# Patient Record
Sex: FEMALE | Race: WHITE | NOT HISPANIC OR LATINO
[De-identification: names, ages, dates, MRNs, and addresses within clinical notes are randomized per-mention and may not be internally consistent; named-entity substitution may affect disease eponyms.]

---

## 2017-11-02 ENCOUNTER — APPOINTMENT (OUTPATIENT)
Dept: ENDOCRINOLOGY | Facility: CLINIC | Age: 72
End: 2017-11-02
Payer: MEDICARE

## 2017-11-02 VITALS
BODY MASS INDEX: 26.28 KG/M2 | HEART RATE: 79 BPM | SYSTOLIC BLOOD PRESSURE: 124 MMHG | WEIGHT: 141 LBS | DIASTOLIC BLOOD PRESSURE: 78 MMHG | OXYGEN SATURATION: 98 % | HEIGHT: 61.5 IN

## 2017-11-02 PROCEDURE — 99214 OFFICE O/P EST MOD 30 MIN: CPT

## 2018-02-08 ENCOUNTER — LABORATORY RESULT (OUTPATIENT)
Age: 73
End: 2018-02-08

## 2018-02-08 ENCOUNTER — APPOINTMENT (OUTPATIENT)
Dept: ENDOCRINOLOGY | Facility: CLINIC | Age: 73
End: 2018-02-08
Payer: MEDICARE

## 2018-02-08 VITALS
BODY MASS INDEX: 26.28 KG/M2 | SYSTOLIC BLOOD PRESSURE: 140 MMHG | WEIGHT: 141 LBS | HEART RATE: 90 BPM | HEIGHT: 61.5 IN | OXYGEN SATURATION: 95 % | DIASTOLIC BLOOD PRESSURE: 80 MMHG

## 2018-02-08 PROCEDURE — 10022: CPT

## 2018-02-08 PROCEDURE — 99213 OFFICE O/P EST LOW 20 MIN: CPT | Mod: 25

## 2018-02-08 PROCEDURE — 76942 ECHO GUIDE FOR BIOPSY: CPT

## 2018-02-13 LAB — TSH SERPL-ACNC: 0.78 UIU/ML

## 2018-09-20 ENCOUNTER — APPOINTMENT (OUTPATIENT)
Dept: ENDOCRINOLOGY | Facility: CLINIC | Age: 73
End: 2018-09-20

## 2018-11-13 ENCOUNTER — APPOINTMENT (OUTPATIENT)
Dept: ENDOCRINOLOGY | Facility: CLINIC | Age: 73
End: 2018-11-13
Payer: MEDICARE

## 2018-11-13 VITALS — HEIGHT: 61.25 IN | BODY MASS INDEX: 27.58 KG/M2 | WEIGHT: 148 LBS

## 2018-11-13 VITALS — DIASTOLIC BLOOD PRESSURE: 70 MMHG | SYSTOLIC BLOOD PRESSURE: 130 MMHG | HEART RATE: 77 BPM | OXYGEN SATURATION: 98 %

## 2018-11-13 PROCEDURE — 76536 US EXAM OF HEAD AND NECK: CPT

## 2018-11-13 PROCEDURE — ZZZZZ: CPT

## 2018-11-13 PROCEDURE — 99214 OFFICE O/P EST MOD 30 MIN: CPT | Mod: 25

## 2018-11-13 PROCEDURE — 77085 DXA BONE DENSITY AXL VRT FX: CPT | Mod: GA

## 2018-11-14 LAB
25(OH)D3 SERPL-MCNC: 46.5 NG/ML
ANION GAP SERPL CALC-SCNC: 10 MMOL/L
BUN SERPL-MCNC: 10 MG/DL
CALCIUM SERPL-MCNC: 9.9 MG/DL
CHLORIDE SERPL-SCNC: 100 MMOL/L
CHOLEST SERPL-MCNC: 192 MG/DL
CHOLEST/HDLC SERPL: 3.3 RATIO
CO2 SERPL-SCNC: 29 MMOL/L
CREAT SERPL-MCNC: 0.8 MG/DL
GLUCOSE SERPL-MCNC: 87 MG/DL
HBA1C MFR BLD HPLC: 6.4 %
HDLC SERPL-MCNC: 58 MG/DL
LDLC SERPL CALC-MCNC: 111 MG/DL
POTASSIUM SERPL-SCNC: 4.3 MMOL/L
SODIUM SERPL-SCNC: 139 MMOL/L
TRIGL SERPL-MCNC: 116 MG/DL
TSH SERPL-ACNC: 0.68 UIU/ML

## 2018-12-14 ENCOUNTER — APPOINTMENT (OUTPATIENT)
Dept: COLORECTAL SURGERY | Facility: CLINIC | Age: 73
End: 2018-12-14
Payer: MEDICARE

## 2018-12-14 VITALS
DIASTOLIC BLOOD PRESSURE: 73 MMHG | HEART RATE: 81 BPM | HEIGHT: 61.5 IN | OXYGEN SATURATION: 99 % | BODY MASS INDEX: 27.03 KG/M2 | RESPIRATION RATE: 15 BRPM | SYSTOLIC BLOOD PRESSURE: 121 MMHG | WEIGHT: 145 LBS

## 2018-12-14 PROCEDURE — 99204 OFFICE O/P NEW MOD 45 MIN: CPT

## 2018-12-14 RX ORDER — CINNAMON BARK 500 MG
CAPSULE ORAL
Refills: 0 | Status: ACTIVE | COMMUNITY

## 2018-12-14 RX ORDER — BACILLUS COAGULANS/INULIN 1B-250 MG
CAPSULE ORAL
Refills: 0 | Status: ACTIVE | COMMUNITY

## 2018-12-28 ENCOUNTER — OUTPATIENT (OUTPATIENT)
Dept: OUTPATIENT SERVICES | Facility: HOSPITAL | Age: 73
LOS: 1 days | End: 2018-12-28
Payer: MEDICARE

## 2018-12-28 VITALS
SYSTOLIC BLOOD PRESSURE: 130 MMHG | DIASTOLIC BLOOD PRESSURE: 80 MMHG | HEART RATE: 72 BPM | RESPIRATION RATE: 18 BRPM | TEMPERATURE: 99 F | OXYGEN SATURATION: 96 % | WEIGHT: 149.03 LBS | HEIGHT: 61 IN

## 2018-12-28 DIAGNOSIS — Z90.89 ACQUIRED ABSENCE OF OTHER ORGANS: Chronic | ICD-10-CM

## 2018-12-28 DIAGNOSIS — K57.92 DIVERTICULITIS OF INTESTINE, PART UNSPECIFIED, WITHOUT PERFORATION OR ABSCESS WITHOUT BLEEDING: ICD-10-CM

## 2018-12-28 DIAGNOSIS — K57.32 DIVERTICULITIS OF LARGE INTESTINE WITHOUT PERFORATION OR ABSCESS WITHOUT BLEEDING: ICD-10-CM

## 2018-12-28 DIAGNOSIS — Z01.818 ENCOUNTER FOR OTHER PREPROCEDURAL EXAMINATION: ICD-10-CM

## 2018-12-28 DIAGNOSIS — Z98.891 HISTORY OF UTERINE SCAR FROM PREVIOUS SURGERY: Chronic | ICD-10-CM

## 2018-12-28 DIAGNOSIS — Z29.9 ENCOUNTER FOR PROPHYLACTIC MEASURES, UNSPECIFIED: ICD-10-CM

## 2018-12-28 LAB
ANION GAP SERPL CALC-SCNC: 13 MMOL/L — SIGNIFICANT CHANGE UP (ref 5–17)
BLD GP AB SCN SERPL QL: NEGATIVE — SIGNIFICANT CHANGE UP
BUN SERPL-MCNC: 14 MG/DL — SIGNIFICANT CHANGE UP (ref 7–23)
CALCIUM SERPL-MCNC: 9.8 MG/DL — SIGNIFICANT CHANGE UP (ref 8.4–10.5)
CHLORIDE SERPL-SCNC: 101 MMOL/L — SIGNIFICANT CHANGE UP (ref 96–108)
CO2 SERPL-SCNC: 26 MMOL/L — SIGNIFICANT CHANGE UP (ref 22–31)
CREAT SERPL-MCNC: 0.78 MG/DL — SIGNIFICANT CHANGE UP (ref 0.5–1.3)
GLUCOSE SERPL-MCNC: 93 MG/DL — SIGNIFICANT CHANGE UP (ref 70–99)
HBA1C BLD-MCNC: 6.6 % — HIGH (ref 4–5.6)
HCT VFR BLD CALC: 41.2 % — SIGNIFICANT CHANGE UP (ref 34.5–45)
HGB BLD-MCNC: 13.6 G/DL — SIGNIFICANT CHANGE UP (ref 11.5–15.5)
MCHC RBC-ENTMCNC: 29.6 PG — SIGNIFICANT CHANGE UP (ref 27–34)
MCHC RBC-ENTMCNC: 33 GM/DL — SIGNIFICANT CHANGE UP (ref 32–36)
MCV RBC AUTO: 89.8 FL — SIGNIFICANT CHANGE UP (ref 80–100)
PLATELET # BLD AUTO: 318 K/UL — SIGNIFICANT CHANGE UP (ref 150–400)
POTASSIUM SERPL-MCNC: 4.1 MMOL/L — SIGNIFICANT CHANGE UP (ref 3.5–5.3)
POTASSIUM SERPL-SCNC: 4.1 MMOL/L — SIGNIFICANT CHANGE UP (ref 3.5–5.3)
RBC # BLD: 4.59 M/UL — SIGNIFICANT CHANGE UP (ref 3.8–5.2)
RBC # FLD: 14.3 % — SIGNIFICANT CHANGE UP (ref 10.3–14.5)
RH IG SCN BLD-IMP: POSITIVE — SIGNIFICANT CHANGE UP
SODIUM SERPL-SCNC: 140 MMOL/L — SIGNIFICANT CHANGE UP (ref 135–145)
WBC # BLD: 7.68 K/UL — SIGNIFICANT CHANGE UP (ref 3.8–10.5)
WBC # FLD AUTO: 7.68 K/UL — SIGNIFICANT CHANGE UP (ref 3.8–10.5)

## 2018-12-28 PROCEDURE — 83036 HEMOGLOBIN GLYCOSYLATED A1C: CPT

## 2018-12-28 PROCEDURE — 86900 BLOOD TYPING SEROLOGIC ABO: CPT

## 2018-12-28 PROCEDURE — 86850 RBC ANTIBODY SCREEN: CPT

## 2018-12-28 PROCEDURE — 80048 BASIC METABOLIC PNL TOTAL CA: CPT

## 2018-12-28 PROCEDURE — 86901 BLOOD TYPING SEROLOGIC RH(D): CPT

## 2018-12-28 PROCEDURE — 85027 COMPLETE CBC AUTOMATED: CPT

## 2018-12-28 PROCEDURE — G0463: CPT

## 2018-12-28 PROCEDURE — 87086 URINE CULTURE/COLONY COUNT: CPT

## 2018-12-28 RX ORDER — CEFOTETAN DISODIUM 1 G
2 VIAL (EA) INJECTION ONCE
Qty: 0 | Refills: 0 | Status: DISCONTINUED | OUTPATIENT
Start: 2019-01-10 | End: 2019-01-11

## 2018-12-28 NOTE — H&P PST ADULT - HISTORY OF PRESENT ILLNESS
Pt. is a 73 year old female with PMH of pre-diabetic not on medication, hypothyroidism, hld, diverticulitis (last hospitalized in 2015). Pt. had routine colonoscopy  on december 7th 2018 and due to possible adhesions was unable to be completed. Virtual colonoscopy was then performed and was significant for thickening and possible strictures. Pt. presents today for presurgical testing for Laparoscopic anterior resection. Pt. states feels well today denies any complaints. Pt. is a 73 year old female with PMH of pre-diabetic not on medication, hypothyroidism, hld, diverticulitis (last hospitalized in 2015). Pt. had routine colonoscopy  on december 7th 2018 and due to possible adhesions was unable to be completed. Virtual colonoscopy was then performed and was significant for thickening and possible strictures. Pt. presents today for presurgical testing for Laparoscopic anterior resection scheduled for 1/10/2019. Pt. states feels well today denies any complaints.

## 2018-12-28 NOTE — H&P PST ADULT - GASTROINTESTINAL DETAILS
Spoke with IR, they are going to contact pt to set up paracentesis per . Wife is aware & agreeable.  
Wife is calling to report pt has fluid build up again in abdomen causing some shortness of breath. Still waiting on auth to be admitted, please advise.  
nontender/bowel sounds normal/normal/soft/no distention

## 2018-12-28 NOTE — H&P PST ADULT - PROBLEM SELECTOR PLAN 1
Laparoscopic anterior resection scheduled 1/10/2019  Introductions reviewed   CBC,BMP,T&S, urine culture   EKG 2/2018 at PCP office Dr. Valentino

## 2018-12-28 NOTE — H&P PST ADULT - ASSESSMENT
CAPRINI SCORE [CLOT updated 18]    AGE RELATED RISK FACTORS                                                       MOBILITY RELATED FACTORS  [ ] Age 41-60 years                                            (1 Point)                  [ ] Bed rest                                                        (1 Point)  [x ] Age: 61-74 years                                           (2 Points)                 [ ] Plaster cast                                                   (2 Points)  [ ] Age= 75 years                                              (3 Points)                 [ ] Bed bound for more than 72 hours                 (2 Points)    DISEASE RELATED RISK FACTORS                                               GENDER SPECIFIC FACTORS  [ ] Edema in the lower extremities                       (1 Point)                  [ ] Pregnancy                                                     (1 Point)  [ ] Varicose veins                                               (1 Point)                  [ ] Post-partum < 6 weeks                                   (1 Point)             [ ] BMI > 25 Kg/m2                                            (1 Point)                  [ ] Hormonal therapy  or oral contraception          (1 Point)                 [ ] Sepsis (in the previous month)                        (1 Point)                  [ ] History of pregnancy complications                 (1 point)  [ ] Pneumonia or serious lung disease                                               [ ] Unexplained or recurrent                     (1 Point)           (in the previous month)                               (1 Point)  [ ] Abnormal pulmonary function test                     (1 Point)                 SURGERY RELATED RISK FACTORS  [ ] Acute myocardial infarction                              (1 Point)                 [x ]  Section                                             (1 Point)  [ ] Congestive heart failure (in the previous month)  (1 Point)               [ ] Minor surgery                                                  (1 Point)   [ ] Inflammatory bowel disease                             (1 Point)                 [ ] Arthroscopic surgery                                        (2 Points)  [ ] Central venous access                                      (2 Points)                [x ] General surgery lasting more than 45 minutes   (2 Points)       [ ] Stroke (in the previous month)                          (5 Points)               [ ] Elective arthroplasty                                         (5 Points)                                                                                                                                               HEMATOLOGY RELATED FACTORS                                                 TRAUMA RELATED RISK FACTORS  [ ] Prior episodes of VTE                                     (3 Points)                [ ] Fracture of the hip, pelvis, or leg                       (5 Points)  [ ] Positive family history for VTE                         (3 Points)                 [ ] Acute spinal cord injury (in the previous month)  (5 Points)  [ ] Prothrombin 01450 A                                     (3 Points)                 [ ] Paralysis  (less than 1 month)                             (5 Points)  [ ] Factor V Leiden                                             (3 Points)                  [ ] Multiple Trauma within 1 month                        (5 Points)  [ ] Lupus anticoagulants                                     (3 Points)                                                           [ ] Anticardiolipin antibodies                               (3 Points)                                                       [ ] High homocysteine in the blood                      (3 Points)                                             [ ] Other congenital or acquired thrombophilia      (3 Points)                                                [ ] Heparin induced thrombocytopenia                  (3 Points)                                          Total Score [      5    ]

## 2018-12-28 NOTE — H&P PST ADULT - PMH
Diverticulitis    HLD (hyperlipidemia)    Hypothyroid Diverticulitis    HLD (hyperlipidemia)    Hypothyroid    Thyroid cyst  being followed by Endocrinologist Dr. Nate Mary

## 2018-12-29 LAB
CULTURE RESULTS: SIGNIFICANT CHANGE UP
SPECIMEN SOURCE: SIGNIFICANT CHANGE UP

## 2019-01-02 ENCOUNTER — MESSAGE (OUTPATIENT)
Age: 74
End: 2019-01-02

## 2019-01-09 ENCOUNTER — TRANSCRIPTION ENCOUNTER (OUTPATIENT)
Age: 74
End: 2019-01-09

## 2019-01-10 ENCOUNTER — RESULT REVIEW (OUTPATIENT)
Age: 74
End: 2019-01-10

## 2019-01-10 ENCOUNTER — INPATIENT (INPATIENT)
Facility: HOSPITAL | Age: 74
LOS: 1 days | Discharge: HOME | DRG: 331 | End: 2019-01-12
Attending: SURGERY | Admitting: SURGERY
Payer: MEDICARE

## 2019-01-10 ENCOUNTER — APPOINTMENT (OUTPATIENT)
Dept: COLORECTAL SURGERY | Facility: HOSPITAL | Age: 74
End: 2019-01-10

## 2019-01-10 VITALS
WEIGHT: 149.03 LBS | SYSTOLIC BLOOD PRESSURE: 158 MMHG | DIASTOLIC BLOOD PRESSURE: 81 MMHG | HEIGHT: 61 IN | OXYGEN SATURATION: 98 % | TEMPERATURE: 99 F | RESPIRATION RATE: 18 BRPM | HEART RATE: 80 BPM

## 2019-01-10 DIAGNOSIS — Z98.891 HISTORY OF UTERINE SCAR FROM PREVIOUS SURGERY: Chronic | ICD-10-CM

## 2019-01-10 DIAGNOSIS — K57.32 DIVERTICULITIS OF LARGE INTESTINE WITHOUT PERFORATION OR ABSCESS WITHOUT BLEEDING: ICD-10-CM

## 2019-01-10 DIAGNOSIS — Z90.89 ACQUIRED ABSENCE OF OTHER ORGANS: Chronic | ICD-10-CM

## 2019-01-10 LAB
GLUCOSE BLDC GLUCOMTR-MCNC: 100 MG/DL — HIGH (ref 70–99)
RH IG SCN BLD-IMP: POSITIVE — SIGNIFICANT CHANGE UP

## 2019-01-10 PROCEDURE — 45300 PROCTOSIGMOIDOSCOPY DX: CPT

## 2019-01-10 PROCEDURE — 44213 LAP MOBIL SPLENIC FL ADD-ON: CPT

## 2019-01-10 PROCEDURE — 52005 CYSTO W/URTRL CATHJ: CPT

## 2019-01-10 PROCEDURE — 88307 TISSUE EXAM BY PATHOLOGIST: CPT | Mod: 26

## 2019-01-10 PROCEDURE — 44207 L COLECTOMY/COLOPROCTOSTOMY: CPT

## 2019-01-10 RX ORDER — HEPARIN SODIUM 5000 [USP'U]/ML
5000 INJECTION INTRAVENOUS; SUBCUTANEOUS EVERY 8 HOURS
Qty: 0 | Refills: 0 | Status: DISCONTINUED | OUTPATIENT
Start: 2019-01-10 | End: 2019-01-12

## 2019-01-10 RX ORDER — NALOXONE HYDROCHLORIDE 4 MG/.1ML
0.1 SPRAY NASAL
Qty: 0 | Refills: 0 | Status: DISCONTINUED | OUTPATIENT
Start: 2019-01-10 | End: 2019-01-11

## 2019-01-10 RX ORDER — ONDANSETRON 8 MG/1
4 TABLET, FILM COATED ORAL EVERY 6 HOURS
Qty: 0 | Refills: 0 | Status: DISCONTINUED | OUTPATIENT
Start: 2019-01-10 | End: 2019-01-11

## 2019-01-10 RX ORDER — GABAPENTIN 400 MG/1
600 CAPSULE ORAL ONCE
Qty: 0 | Refills: 0 | Status: COMPLETED | OUTPATIENT
Start: 2019-01-10 | End: 2019-01-10

## 2019-01-10 RX ORDER — MORPHINE SULFATE 50 MG/1
0.15 CAPSULE, EXTENDED RELEASE ORAL ONCE
Qty: 0 | Refills: 0 | Status: DISCONTINUED | OUTPATIENT
Start: 2019-01-10 | End: 2019-01-11

## 2019-01-10 RX ORDER — SODIUM CHLORIDE 9 MG/ML
1000 INJECTION INTRAMUSCULAR; INTRAVENOUS; SUBCUTANEOUS
Qty: 0 | Refills: 0 | Status: DISCONTINUED | OUTPATIENT
Start: 2019-01-10 | End: 2019-01-11

## 2019-01-10 RX ORDER — CELECOXIB 200 MG/1
400 CAPSULE ORAL ONCE
Qty: 0 | Refills: 0 | Status: COMPLETED | OUTPATIENT
Start: 2019-01-10 | End: 2019-01-10

## 2019-01-10 RX ORDER — ACETAMINOPHEN 500 MG
1000 TABLET ORAL ONCE
Qty: 0 | Refills: 0 | Status: COMPLETED | OUTPATIENT
Start: 2019-01-10 | End: 2019-01-10

## 2019-01-10 RX ORDER — LEVOTHYROXINE SODIUM 125 MCG
1 TABLET ORAL
Qty: 0 | Refills: 0 | COMMUNITY

## 2019-01-10 RX ORDER — LIDOCAINE HCL 20 MG/ML
0.2 VIAL (ML) INJECTION ONCE
Qty: 0 | Refills: 0 | Status: DISCONTINUED | OUTPATIENT
Start: 2019-01-10 | End: 2019-01-10

## 2019-01-10 RX ORDER — KETOROLAC TROMETHAMINE 30 MG/ML
15 SYRINGE (ML) INJECTION EVERY 8 HOURS
Qty: 0 | Refills: 0 | Status: DISCONTINUED | OUTPATIENT
Start: 2019-01-10 | End: 2019-01-12

## 2019-01-10 RX ORDER — OXYCODONE HYDROCHLORIDE 5 MG/1
5 TABLET ORAL
Qty: 0 | Refills: 0 | Status: DISCONTINUED | OUTPATIENT
Start: 2019-01-10 | End: 2019-01-12

## 2019-01-10 RX ORDER — ACETAMINOPHEN 500 MG
1000 TABLET ORAL ONCE
Qty: 0 | Refills: 0 | Status: COMPLETED | OUTPATIENT
Start: 2019-01-11 | End: 2019-01-11

## 2019-01-10 RX ORDER — OXYCODONE HYDROCHLORIDE 5 MG/1
10 TABLET ORAL
Qty: 0 | Refills: 0 | Status: DISCONTINUED | OUTPATIENT
Start: 2019-01-10 | End: 2019-01-12

## 2019-01-10 RX ORDER — HYDROMORPHONE HYDROCHLORIDE 2 MG/ML
0.25 INJECTION INTRAMUSCULAR; INTRAVENOUS; SUBCUTANEOUS
Qty: 0 | Refills: 0 | Status: DISCONTINUED | OUTPATIENT
Start: 2019-01-10 | End: 2019-01-10

## 2019-01-10 RX ORDER — SODIUM CHLORIDE 9 MG/ML
3 INJECTION INTRAMUSCULAR; INTRAVENOUS; SUBCUTANEOUS EVERY 8 HOURS
Qty: 0 | Refills: 0 | Status: DISCONTINUED | OUTPATIENT
Start: 2019-01-10 | End: 2019-01-10

## 2019-01-10 RX ORDER — METOCLOPRAMIDE HCL 10 MG
10 TABLET ORAL ONCE
Qty: 0 | Refills: 0 | Status: COMPLETED | OUTPATIENT
Start: 2019-01-10 | End: 2019-01-10

## 2019-01-10 RX ORDER — ASPIRIN/CALCIUM CARB/MAGNESIUM 324 MG
1 TABLET ORAL
Qty: 0 | Refills: 0 | COMMUNITY

## 2019-01-10 RX ORDER — ATORVASTATIN CALCIUM 80 MG/1
1 TABLET, FILM COATED ORAL
Qty: 0 | Refills: 0 | COMMUNITY

## 2019-01-10 RX ORDER — INFLUENZA VIRUS VACCINE 15; 15; 15; 15 UG/.5ML; UG/.5ML; UG/.5ML; UG/.5ML
0.5 SUSPENSION INTRAMUSCULAR ONCE
Qty: 0 | Refills: 0 | Status: DISCONTINUED | OUTPATIENT
Start: 2019-01-10 | End: 2019-01-12

## 2019-01-10 RX ADMIN — Medication 15 MILLIGRAM(S): at 22:00

## 2019-01-10 RX ADMIN — Medication 1000 MILLIGRAM(S): at 22:00

## 2019-01-10 RX ADMIN — Medication 10 MILLIGRAM(S): at 21:09

## 2019-01-10 RX ADMIN — SODIUM CHLORIDE 3 MILLILITER(S): 9 INJECTION INTRAMUSCULAR; INTRAVENOUS; SUBCUTANEOUS at 11:52

## 2019-01-10 RX ADMIN — CELECOXIB 400 MILLIGRAM(S): 200 CAPSULE ORAL at 11:43

## 2019-01-10 RX ADMIN — Medication 400 MILLIGRAM(S): at 21:30

## 2019-01-10 RX ADMIN — Medication 15 MILLIGRAM(S): at 21:30

## 2019-01-10 RX ADMIN — ONDANSETRON 4 MILLIGRAM(S): 8 TABLET, FILM COATED ORAL at 17:28

## 2019-01-10 RX ADMIN — GABAPENTIN 600 MILLIGRAM(S): 400 CAPSULE ORAL at 11:43

## 2019-01-10 NOTE — CHART NOTE - NSCHARTNOTEFT_GEN_A_CORE
Post-operative Check  -- TO BE UPDATED AT 9PM --    SUBJECTIVE: No acute events in the immediate post-operative period. Pain well controlled.     OBJECTIVE:  T(C): 36.7 (01-10-19 @ 17:00), Max: 37 (01-10-19 @ 11:18)  HR: 72 (01-10-19 @ 17:20) (72 - 85)  BP: 108/59 (01-10-19 @ 17:20) (108/56 - 158/90)  RR: 14 (01-10-19 @ 17:20) (12 - 18)  SpO2: 98% (01-10-19 @ 17:20) (97% - 98%)        Physical Exam:   General: well developed, well nourished, NAD  Neuro: alert and oriented, no focal deficits, moves all extremities spontaneously  HEENT: NCAT, EOMI, anicteric, mucosa moist  Respiratory: airway patent, respirations unlabored  CVS: regular rate and rhythm  Abdomen: soft, nontender, nondistended  Extremities: no edema, sensation and movement grossly intact  Skin: warm, dry, appropriate color    ASSESSMENT:   EILEEN TRAYLOR is a 73y Female POD#0 from  laparoscopic LAR with splenic with splenic flexure takedown and rigid sigmoidoscopy for severe diverticulitis       PLAN:  - Enhanced recovery protocol  - CLD  - SQH tonight   - Pain management: Tylenol and toradol RTC  - d/c Ucath on 1/11  - d/c villeda several hours after d/cing ucath  - Change packing daily in abdominal wall Post-operative Check      SUBJECTIVE: No acute events in the immediate post-operative period. Pain well controlled. Denies nausea or vomiting. No flatus. No BMs.     OBJECTIVE:  T(C): 36.7 (01-10-19 @ 17:00), Max: 37 (01-10-19 @ 11:18)  HR: 72 (01-10-19 @ 17:20) (72 - 85)  BP: 108/59 (01-10-19 @ 17:20) (108/56 - 158/90)  RR: 14 (01-10-19 @ 17:20) (12 - 18)  SpO2: 98% (01-10-19 @ 17:20) (97% - 98%)        Physical Exam:   General: well developed, well nourished, NAD  Neuro: alert and oriented, no focal deficits, moves all extremities spontaneously  HEENT: NCAT, EOMI, anicteric, mucosa moist  Respiratory: airway patent, respirations unlabored  CVS: regular rate and rhythm  Abdomen: soft, nontender, nondistended, midline dressing with SS drainage, trochar sites c/d  : ucath in place, villeda in place with blood colored urine in villeda bag  Extremities: no edema, sensation and movement grossly intact  Skin: warm, dry, appropriate color    ASSESSMENT:   EILEEN TRAYLOR is a 73y Female POD#0 from  laparoscopic LAR with splenic with splenic flexure takedown and rigid sigmoidoscopy for severe diverticulitis       PLAN:  - Enhanced recovery protocol  - CLD  - SQH tonight   - Pain management: Tylenol and toradol RTC  - d/c Ucath on 1/11  - d/c villeda several hours after d/cing ucath  - Change packing daily in abdominal wall

## 2019-01-10 NOTE — BRIEF OPERATIVE NOTE - PROCEDURE
<<-----Click on this checkbox to enter Procedure Laparoscopic low anterior resection  01/10/2019  laparoscopic assisted low anterior resection with splenic flexure takedown and rigid sigmoidoscopy.  Active  EBIANCHI

## 2019-01-10 NOTE — BRIEF OPERATIVE NOTE - OPERATION/FINDINGS
- Severe inflammation in the distal sigmoid, lateral abdominal wall and left ovary.   - Intact anastomotic donuts.  - Negative leak test.   - Good hemostasis at the end of the case.

## 2019-01-11 LAB
ANION GAP SERPL CALC-SCNC: 16 MMOL/L — SIGNIFICANT CHANGE UP (ref 5–17)
BASOPHILS # BLD AUTO: 0 K/UL — SIGNIFICANT CHANGE UP (ref 0–0.2)
BASOPHILS NFR BLD AUTO: 0 % — SIGNIFICANT CHANGE UP (ref 0–2)
BUN SERPL-MCNC: 14 MG/DL — SIGNIFICANT CHANGE UP (ref 7–23)
CALCIUM SERPL-MCNC: 8.2 MG/DL — LOW (ref 8.4–10.5)
CHLORIDE SERPL-SCNC: 100 MMOL/L — SIGNIFICANT CHANGE UP (ref 96–108)
CO2 SERPL-SCNC: 22 MMOL/L — SIGNIFICANT CHANGE UP (ref 22–31)
CREAT SERPL-MCNC: 0.87 MG/DL — SIGNIFICANT CHANGE UP (ref 0.5–1.3)
EOSINOPHIL # BLD AUTO: 0 K/UL — SIGNIFICANT CHANGE UP (ref 0–0.5)
EOSINOPHIL NFR BLD AUTO: 0 % — SIGNIFICANT CHANGE UP (ref 0–6)
GLUCOSE SERPL-MCNC: 144 MG/DL — HIGH (ref 70–99)
HCT VFR BLD CALC: 38.2 % — SIGNIFICANT CHANGE UP (ref 34.5–45)
HGB BLD-MCNC: 12.3 G/DL — SIGNIFICANT CHANGE UP (ref 11.5–15.5)
IMM GRANULOCYTES NFR BLD AUTO: 0.2 % — SIGNIFICANT CHANGE UP (ref 0–1.5)
LYMPHOCYTES # BLD AUTO: 1.18 K/UL — SIGNIFICANT CHANGE UP (ref 1–3.3)
LYMPHOCYTES # BLD AUTO: 9 % — LOW (ref 13–44)
MCHC RBC-ENTMCNC: 28.9 PG — SIGNIFICANT CHANGE UP (ref 27–34)
MCHC RBC-ENTMCNC: 32.2 GM/DL — SIGNIFICANT CHANGE UP (ref 32–36)
MCV RBC AUTO: 89.7 FL — SIGNIFICANT CHANGE UP (ref 80–100)
MONOCYTES # BLD AUTO: 0.64 K/UL — SIGNIFICANT CHANGE UP (ref 0–0.9)
MONOCYTES NFR BLD AUTO: 4.9 % — SIGNIFICANT CHANGE UP (ref 2–14)
NEUTROPHILS # BLD AUTO: 11.27 K/UL — HIGH (ref 1.8–7.4)
NEUTROPHILS NFR BLD AUTO: 85.9 % — HIGH (ref 43–77)
PLATELET # BLD AUTO: 293 K/UL — SIGNIFICANT CHANGE UP (ref 150–400)
POTASSIUM SERPL-MCNC: 4.4 MMOL/L — SIGNIFICANT CHANGE UP (ref 3.5–5.3)
POTASSIUM SERPL-SCNC: 4.4 MMOL/L — SIGNIFICANT CHANGE UP (ref 3.5–5.3)
RBC # BLD: 4.26 M/UL — SIGNIFICANT CHANGE UP (ref 3.8–5.2)
RBC # FLD: 14.8 % — HIGH (ref 10.3–14.5)
SODIUM SERPL-SCNC: 138 MMOL/L — SIGNIFICANT CHANGE UP (ref 135–145)
WBC # BLD: 13.12 K/UL — HIGH (ref 3.8–10.5)
WBC # FLD AUTO: 13.12 K/UL — HIGH (ref 3.8–10.5)

## 2019-01-11 RX ORDER — ACETAMINOPHEN 500 MG
975 TABLET ORAL EVERY 6 HOURS
Qty: 0 | Refills: 0 | Status: DISCONTINUED | OUTPATIENT
Start: 2019-01-11 | End: 2019-01-12

## 2019-01-11 RX ORDER — MECLIZINE HCL 12.5 MG
12.5 TABLET ORAL DAILY
Qty: 0 | Refills: 0 | Status: DISCONTINUED | OUTPATIENT
Start: 2019-01-11 | End: 2019-01-12

## 2019-01-11 RX ORDER — ACETAMINOPHEN 500 MG
1000 TABLET ORAL ONCE
Qty: 0 | Refills: 0 | Status: DISCONTINUED | OUTPATIENT
Start: 2019-01-11 | End: 2019-01-11

## 2019-01-11 RX ORDER — MECLIZINE HCL 12.5 MG
12.5 TABLET ORAL ONCE
Qty: 0 | Refills: 0 | Status: COMPLETED | OUTPATIENT
Start: 2019-01-11 | End: 2019-01-11

## 2019-01-11 RX ORDER — LEVOTHYROXINE SODIUM 125 MCG
75 TABLET ORAL DAILY
Qty: 0 | Refills: 0 | Status: DISCONTINUED | OUTPATIENT
Start: 2019-01-11 | End: 2019-01-12

## 2019-01-11 RX ADMIN — Medication 75 MICROGRAM(S): at 09:14

## 2019-01-11 RX ADMIN — Medication 15 MILLIGRAM(S): at 05:32

## 2019-01-11 RX ADMIN — HEPARIN SODIUM 5000 UNIT(S): 5000 INJECTION INTRAVENOUS; SUBCUTANEOUS at 09:14

## 2019-01-11 RX ADMIN — Medication 12.5 MILLIGRAM(S): at 16:43

## 2019-01-11 RX ADMIN — Medication 1000 MILLIGRAM(S): at 05:32

## 2019-01-11 RX ADMIN — HEPARIN SODIUM 5000 UNIT(S): 5000 INJECTION INTRAVENOUS; SUBCUTANEOUS at 01:19

## 2019-01-11 RX ADMIN — Medication 400 MILLIGRAM(S): at 05:01

## 2019-01-11 RX ADMIN — Medication 975 MILLIGRAM(S): at 12:39

## 2019-01-11 RX ADMIN — Medication 12.5 MILLIGRAM(S): at 12:02

## 2019-01-11 RX ADMIN — Medication 15 MILLIGRAM(S): at 05:02

## 2019-01-11 RX ADMIN — SODIUM CHLORIDE 40 MILLILITER(S): 9 INJECTION INTRAMUSCULAR; INTRAVENOUS; SUBCUTANEOUS at 05:02

## 2019-01-11 RX ADMIN — Medication 975 MILLIGRAM(S): at 12:03

## 2019-01-11 RX ADMIN — HEPARIN SODIUM 5000 UNIT(S): 5000 INJECTION INTRAVENOUS; SUBCUTANEOUS at 16:44

## 2019-01-11 NOTE — PROGRESS NOTE ADULT - SUBJECTIVE AND OBJECTIVE BOX
General Surgery Progress Note    SUBJECTIVE:  The patient was seen and examined. No acute events overnight. Ucath discontinued in morning. Pain well controlled. Tolerating clears.  Denies n/v, cp, sob, abd pain.    OBJECTIVE:     ** VITAL SIGNS / I&O's **    Vital Signs Last 24 Hrs  T(C): 37.1 (11 Jan 2019 05:49), Max: 37.1 (11 Jan 2019 05:49)  T(F): 98.8 (11 Jan 2019 05:49), Max: 98.8 (11 Jan 2019 05:49)  HR: 67 (11 Jan 2019 05:49) (62 - 85)  BP: 101/55 (11 Jan 2019 05:49) (95/50 - 158/90)  BP(mean): 80 (10 Arden 2019 21:30) (67 - 116)  RR: 18 (11 Jan 2019 05:49) (12 - 18)  SpO2: 99% (11 Jan 2019 05:49) (97% - 100%)      10 Arden 2019 07:01  -  11 Jan 2019 07:00  --------------------------------------------------------  IN:    IV PiggyBack: 100 mL    sodium chloride 0.9%.: 680 mL  Total IN: 780 mL    OUT:    Indwelling Catheter - Urethral: 570 mL  Total OUT: 570 mL    Total NET: 210 mL          ** PHYSICAL EXAM **    -- CONSTITUTIONAL: Alert, NAD  -- PULMONARY: non-labored respirations  -- ABDOMEN: soft, non-distended, appropriately tender; c/d/i incisions.  -- : villeda in place.    ** LABS **      11 Jan 2019 07:27    138    |  100    |  14     ----------------------------<  144    4.4     |  22     |  0.87     Ca    8.2        11 Jan 2019 07:27        CAPILLARY BLOOD GLUCOSE      POCT Blood Glucose.: 100 mg/dL (10 Arden 2019 10:36)                MEDICATIONS  (STANDING):  cefoTEtan  IVPB 2 Gram(s) IV Intermittent once  heparin  Injectable 5000 Unit(s) SubCutaneous every 8 hours  influenza   Vaccine 0.5 milliLiter(s) IntraMuscular once  ketorolac   Injectable 15 milliGRAM(s) IV Push every 8 hours  morphine PF Spinal 0.15 milliGRAM(s) IntraThecal. once  sodium chloride 0.9%. 1000 milliLiter(s) (40 mL/Hr) IV Continuous <Continuous>    MEDICATIONS  (PRN):  naloxone Injectable 0.1 milliGRAM(s) IV Push every 3 minutes PRN For ANY of the following changes in patient status:  A. RR LESS THAN 10 breaths per minute, B. Oxygen saturation LESS THAN 90%, C. Sedation score of 6  ondansetron Injectable 4 milliGRAM(s) IV Push every 6 hours PRN Nausea  oxyCODONE    IR 5 milliGRAM(s) Oral every 3 hours PRN Mild Pain (1 - 3)  oxyCODONE    IR 10 milliGRAM(s) Oral every 3 hours PRN Moderate Pain (4 - 6)

## 2019-01-11 NOTE — PROGRESS NOTE ADULT - SUBJECTIVE AND OBJECTIVE BOX
Day 1 of Anesthesia Pain Management Service    SUBJECTIVE:  Pain Scale Score:          [X] Refer to charted pain scores    THERAPY:    s/p 150mcg PF morphine on 1\10\19 1255      MEDICATIONS  (STANDING):  cefoTEtan  IVPB 2 Gram(s) IV Intermittent once  heparin  Injectable 5000 Unit(s) SubCutaneous every 8 hours  influenza   Vaccine 0.5 milliLiter(s) IntraMuscular once  ketorolac   Injectable 15 milliGRAM(s) IV Push every 8 hours  levothyroxine 75 MICROGram(s) Oral daily  meclizine 12.5 milliGRAM(s) Oral daily  morphine PF Spinal 0.15 milliGRAM(s) IntraThecal. once  sodium chloride 0.9%. 1000 milliLiter(s) (40 mL/Hr) IV Continuous <Continuous>    MEDICATIONS  (PRN):  naloxone Injectable 0.1 milliGRAM(s) IV Push every 3 minutes PRN For ANY of the following changes in patient status:  A. RR LESS THAN 10 breaths per minute, B. Oxygen saturation LESS THAN 90%, C. Sedation score of 6  ondansetron Injectable 4 milliGRAM(s) IV Push every 6 hours PRN Nausea  oxyCODONE    IR 5 milliGRAM(s) Oral every 3 hours PRN Mild Pain (1 - 3)  oxyCODONE    IR 10 milliGRAM(s) Oral every 3 hours PRN Moderate Pain (4 - 6)      OBJECTIVE:    Sedation:        	[X] Alert	[ ] Drowsy	[ ] Arousable      [ ] Asleep       [ ] Unresponsive    Side Effects:	[X] None	[ ] Nausea	[ ] Vomiting         [ ] Pruritus  		[ ] Weakness            [ ] Numbness	          [ ] Other:    Vital Signs Last 24 Hrs  T(C): 37.1 (11 Jan 2019 05:49), Max: 37.1 (11 Jan 2019 05:49)  T(F): 98.8 (11 Jan 2019 05:49), Max: 98.8 (11 Jan 2019 05:49)  HR: 67 (11 Jan 2019 05:49) (62 - 85)  BP: 101/55 (11 Jan 2019 05:49) (95/50 - 158/90)  BP(mean): 80 (10 Arden 2019 21:30) (67 - 116)  RR: 18 (11 Jan 2019 05:49) (12 - 18)  SpO2: 99% (11 Jan 2019 05:49) (97% - 100%)    ASSESSMENT/ PLAN  [X] Patient transitioned to prn analgesics  [X] Pain management per primary service, pain service to sign off   [X]Documentation and Verification of current medications

## 2019-01-11 NOTE — PROGRESS NOTE ADULT - ASSESSMENT
73y Female POD#1 from  laparoscopic LAR with splenic with splenic flexure takedown and rigid sigmoidoscopy for severe diverticulitis. ERP.      PLAN:  - Pain management: Tylenol and toradol RTC  - clears now, lrd for dinner if tolerating clears  - d/c villeda at 12pm today  - dvt ppx: SQH   - oob/ambulate as tolerated  - home meds

## 2019-01-12 ENCOUNTER — TRANSCRIPTION ENCOUNTER (OUTPATIENT)
Age: 74
End: 2019-01-12

## 2019-01-12 VITALS
DIASTOLIC BLOOD PRESSURE: 78 MMHG | RESPIRATION RATE: 18 BRPM | OXYGEN SATURATION: 96 % | TEMPERATURE: 99 F | HEART RATE: 74 BPM | SYSTOLIC BLOOD PRESSURE: 131 MMHG

## 2019-01-12 LAB
ANION GAP SERPL CALC-SCNC: 11 MMOL/L — SIGNIFICANT CHANGE UP (ref 5–17)
BUN SERPL-MCNC: 16 MG/DL — SIGNIFICANT CHANGE UP (ref 7–23)
CALCIUM SERPL-MCNC: 8.5 MG/DL — SIGNIFICANT CHANGE UP (ref 8.4–10.5)
CHLORIDE SERPL-SCNC: 97 MMOL/L — SIGNIFICANT CHANGE UP (ref 96–108)
CO2 SERPL-SCNC: 26 MMOL/L — SIGNIFICANT CHANGE UP (ref 22–31)
CREAT SERPL-MCNC: 0.85 MG/DL — SIGNIFICANT CHANGE UP (ref 0.5–1.3)
GLUCOSE SERPL-MCNC: 111 MG/DL — HIGH (ref 70–99)
HCT VFR BLD CALC: 36.4 % — SIGNIFICANT CHANGE UP (ref 34.5–45)
HGB BLD-MCNC: 11.8 G/DL — SIGNIFICANT CHANGE UP (ref 11.5–15.5)
MAGNESIUM SERPL-MCNC: 2.3 MG/DL — SIGNIFICANT CHANGE UP (ref 1.6–2.6)
MCHC RBC-ENTMCNC: 29.6 PG — SIGNIFICANT CHANGE UP (ref 27–34)
MCHC RBC-ENTMCNC: 32.4 GM/DL — SIGNIFICANT CHANGE UP (ref 32–36)
MCV RBC AUTO: 91.5 FL — SIGNIFICANT CHANGE UP (ref 80–100)
PHOSPHATE SERPL-MCNC: 2.3 MG/DL — LOW (ref 2.5–4.5)
PLATELET # BLD AUTO: 302 K/UL — SIGNIFICANT CHANGE UP (ref 150–400)
POTASSIUM SERPL-MCNC: 4 MMOL/L — SIGNIFICANT CHANGE UP (ref 3.5–5.3)
POTASSIUM SERPL-SCNC: 4 MMOL/L — SIGNIFICANT CHANGE UP (ref 3.5–5.3)
RBC # BLD: 3.98 M/UL — SIGNIFICANT CHANGE UP (ref 3.8–5.2)
RBC # FLD: 14.4 % — SIGNIFICANT CHANGE UP (ref 10.3–14.5)
SODIUM SERPL-SCNC: 134 MMOL/L — LOW (ref 135–145)
WBC # BLD: 10.51 K/UL — HIGH (ref 3.8–10.5)
WBC # FLD AUTO: 10.51 K/UL — HIGH (ref 3.8–10.5)

## 2019-01-12 PROCEDURE — 88307 TISSUE EXAM BY PATHOLOGIST: CPT

## 2019-01-12 PROCEDURE — 82962 GLUCOSE BLOOD TEST: CPT

## 2019-01-12 PROCEDURE — 80048 BASIC METABOLIC PNL TOTAL CA: CPT

## 2019-01-12 PROCEDURE — 86900 BLOOD TYPING SEROLOGIC ABO: CPT

## 2019-01-12 PROCEDURE — 85027 COMPLETE CBC AUTOMATED: CPT

## 2019-01-12 PROCEDURE — 86901 BLOOD TYPING SEROLOGIC RH(D): CPT

## 2019-01-12 PROCEDURE — 84100 ASSAY OF PHOSPHORUS: CPT

## 2019-01-12 PROCEDURE — C1758: CPT

## 2019-01-12 PROCEDURE — 83735 ASSAY OF MAGNESIUM: CPT

## 2019-01-12 RX ORDER — SODIUM,POTASSIUM PHOSPHATES 278-250MG
2 POWDER IN PACKET (EA) ORAL
Qty: 0 | Refills: 0 | Status: COMPLETED | OUTPATIENT
Start: 2019-01-12 | End: 2019-01-12

## 2019-01-12 RX ORDER — OXYCODONE HYDROCHLORIDE 5 MG/1
1 TABLET ORAL
Qty: 12 | Refills: 0 | OUTPATIENT
Start: 2019-01-12

## 2019-01-12 RX ORDER — OXYCODONE HYDROCHLORIDE 5 MG/1
1 TABLET ORAL
Qty: 0 | Refills: 0 | COMMUNITY
Start: 2019-01-12

## 2019-01-12 RX ADMIN — HEPARIN SODIUM 5000 UNIT(S): 5000 INJECTION INTRAVENOUS; SUBCUTANEOUS at 08:12

## 2019-01-12 RX ADMIN — Medication 975 MILLIGRAM(S): at 17:12

## 2019-01-12 RX ADMIN — Medication 975 MILLIGRAM(S): at 11:37

## 2019-01-12 RX ADMIN — Medication 2 TABLET(S): at 11:35

## 2019-01-12 RX ADMIN — Medication 2 TABLET(S): at 13:37

## 2019-01-12 RX ADMIN — Medication 12.5 MILLIGRAM(S): at 12:47

## 2019-01-12 RX ADMIN — HEPARIN SODIUM 5000 UNIT(S): 5000 INJECTION INTRAVENOUS; SUBCUTANEOUS at 00:49

## 2019-01-12 RX ADMIN — Medication 75 MICROGRAM(S): at 06:21

## 2019-01-12 RX ADMIN — Medication 975 MILLIGRAM(S): at 17:10

## 2019-01-12 RX ADMIN — Medication 975 MILLIGRAM(S): at 01:19

## 2019-01-12 RX ADMIN — Medication 975 MILLIGRAM(S): at 00:49

## 2019-01-12 RX ADMIN — HEPARIN SODIUM 5000 UNIT(S): 5000 INJECTION INTRAVENOUS; SUBCUTANEOUS at 17:10

## 2019-01-12 RX ADMIN — Medication 975 MILLIGRAM(S): at 11:36

## 2019-01-12 NOTE — DISCHARGE NOTE ADULT - CARE PROVIDER_API CALL
Hugo Lakhani), ColonRectal Surgery; Surgery  [address]  [address]  [place] [address]  Phone: [phone]  Fax: [phone]

## 2019-01-12 NOTE — DISCHARGE NOTE ADULT - MEDICATION SUMMARY - MEDICATIONS TO TAKE
I will START or STAY ON the medications listed below when I get home from the hospital:    oxyCODONE 5 mg oral tablet  -- 1 tab(s) by mouth every 4 hours, As Needed MDD:6  -- Indication: For postop pain    aspirin 81 mg oral tablet, chewable  -- 1 tab(s) by mouth once a day  -- Indication: For Home med    atorvastatin 20 mg oral tablet  -- 1 tab(s) by mouth once a day  -- Indication: For Home med    levothyroxine 75 mcg (0.075 mg) oral tablet  -- 1 tab(s) by mouth once a day  -- Indication: For Home med

## 2019-01-12 NOTE — DISCHARGE NOTE ADULT - CARE PLAN
Principal Discharge DX:	Diverticulitis  Goal:	surgical management with low anterior resection  Assessment and plan of treatment:	now s/p low anterior resection  recovering well, tolerating diet, ambulating and voiding appropriately, pain well controlled.

## 2019-01-12 NOTE — PROGRESS NOTE ADULT - ASSESSMENT
73y Female POD#2 s/p  laparoscopic LAR with splenic with splenic flexure takedown and rigid sigmoidoscopy for severe diverticulitis. ERP.      PLAN:  - Pain management: Tylenol and toradol RTC  - continue lrd  - dvt ppx: SQH   - oob/ambulate as tolerated  - home meds  - dispo: home today

## 2019-01-12 NOTE — DISCHARGE NOTE ADULT - ADDITIONAL INSTRUCTIONS
WOUND CARE:  Please change midline incision with clean gauze and tape.   BATHING: Please do not submerge wound underwater. You may shower and/or sponge bathe.  ACTIVITY: No heavy lifting or straining. Otherwise, you may return to your usual level of physical activity. If you are taking narcotic pain medication DO NOT drive a car, operate machinery or make important decisions.  DIET: low residue diet  NOTIFY YOUR SURGEON IF: You have any bleeding that does not stop, any pus draining from your wound(s), any fever (over 101 F), persistent nausea/vomiting, persistent diarrhea, or if your pain is not controlled on your discharge pain medications.  FOLLOW-UP: Please follow up with Dr. Lakhani in office in 1-2 weeks. Please call his office [phone] to set up appointment. Also, please follow up your primary care physician in one week regarding your hospitalization

## 2019-01-12 NOTE — PROGRESS NOTE ADULT - SUBJECTIVE AND OBJECTIVE BOX
General Surgery Progress Note    SUBJECTIVE:  The patient was seen and examined. No acute events overnight. Tolerating diet. +flatus, +BMs.  Denies n/v, cp, sob, abdominal pain. Ambulating and voiding appropriately.    OBJECTIVE:     ** VITAL SIGNS / I&O's **    Vital Signs Last 24 Hrs  T(C): 36.9 (12 Jan 2019 05:12), Max: 36.9 (11 Jan 2019 17:19)  T(F): 98.4 (12 Jan 2019 05:12), Max: 98.5 (11 Jan 2019 17:19)  HR: 70 (12 Jan 2019 05:12) (70 - 84)  BP: 125/80 (12 Jan 2019 05:12) (101/62 - 128/64)  BP(mean): --  RR: 18 (12 Jan 2019 05:12) (18 - 20)  SpO2: 98% (12 Jan 2019 05:12) (95% - 99%)      11 Jan 2019 07:01  -  12 Jan 2019 07:00  --------------------------------------------------------  IN:    Oral Fluid: 900 mL  Total IN: 900 mL    OUT:    Indwelling Catheter - Urethral: 250 mL    Voided: 1775 mL  Total OUT: 2025 mL    Total NET: -1125 mL      12 Jan 2019 07:01  -  12 Jan 2019 08:25  --------------------------------------------------------  IN:  Total IN: 0 mL    OUT:    Voided: 700 mL  Total OUT: 700 mL    Total NET: -700 mL          ** PHYSICAL EXAM **    -- CONSTITUTIONAL: Alert, NAD  -- PULMONARY: non-labored respirations  -- ABDOMEN: soft, non-distended, non-tender; c/d/i incisions.  -- NEURO: A&Ox3    ** LABS **                          12.3   13.12 )-----------( 293      ( 11 Jan 2019 08:12 )             38.2     12 Jan 2019 07:14    134    |  97     |  16     ----------------------------<  111    4.0     |  26     |  0.85     Ca    8.5        12 Jan 2019 07:14  Phos  2.3       12 Jan 2019 07:14  Mg     2.3       12 Jan 2019 07:14        CAPILLARY BLOOD GLUCOSE                    MEDICATIONS  (STANDING):  acetaminophen   Tablet .. 975 milliGRAM(s) Oral every 6 hours  heparin  Injectable 5000 Unit(s) SubCutaneous every 8 hours  influenza   Vaccine 0.5 milliLiter(s) IntraMuscular once  ketorolac   Injectable 15 milliGRAM(s) IV Push every 8 hours  levothyroxine 75 MICROGram(s) Oral daily  meclizine 12.5 milliGRAM(s) Oral daily    MEDICATIONS  (PRN):  oxyCODONE    IR 5 milliGRAM(s) Oral every 3 hours PRN Mild Pain (1 - 3)  oxyCODONE    IR 10 milliGRAM(s) Oral every 3 hours PRN Moderate Pain (4 - 6)

## 2019-01-12 NOTE — DISCHARGE NOTE ADULT - PLAN OF CARE
surgical management with low anterior resection now s/p low anterior resection  recovering well, tolerating diet, ambulating and voiding appropriately, pain well controlled.

## 2019-01-12 NOTE — DISCHARGE NOTE ADULT - PATIENT PORTAL LINK FT
You can access the [facility] Portal, offered by [facility], by registering with the following website: [email or web address]

## 2019-01-14 PROBLEM — K57.92 DIVERTICULITIS OF INTESTINE, PART UNSPECIFIED, WITHOUT PERFORATION OR ABSCESS WITHOUT BLEEDING: Chronic | Status: ACTIVE | Noted: 2018-12-28

## 2019-01-14 PROBLEM — E03.9 HYPOTHYROIDISM, UNSPECIFIED: Chronic | Status: ACTIVE | Noted: 2018-12-28

## 2019-01-14 PROBLEM — E04.1 NONTOXIC SINGLE THYROID NODULE: Chronic | Status: ACTIVE | Noted: 2018-12-28

## 2019-01-14 PROBLEM — E78.5 HYPERLIPIDEMIA, UNSPECIFIED: Chronic | Status: ACTIVE | Noted: 2018-12-28

## 2019-01-22 LAB — SURGICAL PATHOLOGY STUDY: SIGNIFICANT CHANGE UP

## 2019-01-23 ENCOUNTER — APPOINTMENT (OUTPATIENT)
Dept: COLORECTAL SURGERY | Facility: CLINIC | Age: 74
End: 2019-01-23
Payer: MEDICARE

## 2019-01-23 VITALS — TEMPERATURE: 98.3 F

## 2019-01-23 PROCEDURE — 99024 POSTOP FOLLOW-UP VISIT: CPT

## 2019-01-24 NOTE — HISTORY OF PRESENT ILLNESS
[FreeTextEntry1] : 73-year-old white female who presents for her first postoperative visit. She is approximately 15 days status post laparoscopic-assisted anterior resection for diverticulitis with ERAS protocol. She had a rapid and uneventful postoperative recuperation and was discharged home on postoperative day #2. She looks and feels excellently having had no incisional pain postop or currently. She is tolerating diet and moving her bowels without difficulty.\par \par Final pathology was consistent with diverticular disease.\par \par Physical examination reveals a soft, nontender, nondistended abdomen. The trocar sites and specimen extraction site are healed with no evidence of infection. Surgical staples were removed.\par \par Patient was assured that she is making an excellent recuperation. I asked her to remain on a low-residue diet and avoid strenuous activity.\par \par I will see her in 4 weeks for sigmoidoscopy under sedation.

## 2019-02-15 RX ORDER — NEOMYCIN SULFATE 500 MG/1
500 TABLET ORAL
Qty: 6 | Refills: 0 | Status: DISCONTINUED | COMMUNITY
Start: 2019-01-02 | End: 2019-02-15

## 2019-02-15 RX ORDER — METRONIDAZOLE 500 MG/1
500 TABLET ORAL
Qty: 3 | Refills: 0 | Status: DISCONTINUED | COMMUNITY
Start: 2019-01-02 | End: 2019-02-15

## 2019-02-20 ENCOUNTER — APPOINTMENT (OUTPATIENT)
Dept: COLORECTAL SURGERY | Facility: CLINIC | Age: 74
End: 2019-02-20
Payer: MEDICARE

## 2019-02-20 PROCEDURE — 45330 DIAGNOSTIC SIGMOIDOSCOPY: CPT | Mod: 58

## 2019-04-03 ENCOUNTER — OTHER (OUTPATIENT)
Age: 74
End: 2019-04-03

## 2019-06-28 ENCOUNTER — APPOINTMENT (OUTPATIENT)
Dept: ENDOCRINOLOGY | Facility: CLINIC | Age: 74
End: 2019-06-28
Payer: MEDICARE

## 2019-06-28 VITALS
OXYGEN SATURATION: 97 % | HEIGHT: 62 IN | HEART RATE: 80 BPM | SYSTOLIC BLOOD PRESSURE: 120 MMHG | DIASTOLIC BLOOD PRESSURE: 70 MMHG | WEIGHT: 147 LBS | BODY MASS INDEX: 27.05 KG/M2

## 2019-06-28 PROCEDURE — 76536 US EXAM OF HEAD AND NECK: CPT

## 2019-06-28 PROCEDURE — 99214 OFFICE O/P EST MOD 30 MIN: CPT | Mod: 25

## 2019-06-28 NOTE — ASSESSMENT
[FreeTextEntry1] : 73-year-old female with hypothyroidism and thyroid nodule \par Previous biopsy in 2015 is benign. 07/2018 biopsy is non-diagnostic.\par \par -Thyroid nodule is 3.97 x 2.55 x 3.45 cm and is mixed solid cystic ( 50/50)\par -Will continue to monitor every 6 months \par \par Hypothyroidism: \par -TFTS will be checked today \par -Will adjust dosing of levothyroxine as necessary (currently on Lt4 75 mcg x 6 days) \par \par Osteopenia \par -Distal 1/3 rd: -0.1, L1-L4: -1.9, Fem neck: -1.7\par -Minimal change from 2016\par -Continue on Vitamin D and calcium supplementation \par \par Follow up in 6 months. \par

## 2019-06-28 NOTE — REVIEW OF SYSTEMS
[Fatigue] : no fatigue [Decreased Appetite] : appetite not decreased [Recent Weight Gain (___ Lbs)] : no recent weight gain [Recent Weight Loss (___ Lbs)] : no recent weight loss [Visual Field Defect] : no visual field defect [Blurry Vision] : no blurred vision [Dry Eyes] : no dryness of the eyes [Eyes Itch] : no itching of the eyes [Dysphagia] : no dysphagia [Dysphonia] : no dysphonia [Neck Pain] : no neck pain [Nasal Congestion] : no nasal congestion [Palpitations] : no palpitations [Chest Pain] : no chest pain [Heart Rate Is Slow] : the heart rate was not slow [Heart Rate Is Fast] : the heart rate was not fast [Shortness Of Breath] : no shortness of breath [Wheezing] : no wheezing was heard [Cough] : no cough [SOB on Exertion] : no shortness of breath during exertion [Nausea] : no nausea [Vomiting] : no vomiting was observed [Constipation] : no constipation [Diarrhea] : no diarrhea [Polyuria] : no polyuria [Irregular Menses] : regular menses [Joint Pain] : no joint pain [Joint Stiffness] : no joint stiffness [Muscle Weakness] : no muscle weakness [Muscle Cramps] : no muscle cramps [Acanthosis] : no acanthosis  [Hirsutism] : no hirsutism [Acne] : no acne [Hair Loss] : no hair loss [Headache] : no headaches [Tremors] : no tremors [Dizziness] : no dizziness [Depression] : no depression [Polydipsia] : no polydipsia [Anxiety] : no anxiety [Cold Intolerance] : cold tolerant [Galactorrhea] : no galactorrhea  [Heat Intolerance] : heat tolerant [Easy Bleeding] : no ~M tendency for easy bleeding [Easy Bruising] : no tendency for easy bruising [Swelling] : no swelling

## 2019-06-28 NOTE — PROCEDURE
[[employer] e 2008 model, 10-12 MHz frequencies] : multiple real time longitudinal and transverse images were obtained using a high resolution ultrasound with a linear transducer, [employer] e 2008 model, 10-12 MHz frequencies. All measurements will be reported as longitudinal x marion-posterior x transverse. [Thyroid Nodule] : thyroid nodule [] : a homogenous parenchyma [Left Thyroid] : left [Mixed] : mixed [Mid] : mid pole there is a  [Indistinct] : indistinct [Ovoid] : ovoid in shape [No calcification] : no calcification [No] : does not have a halo [Peripheral vascularity] : peripheral vascularity [2] : 2 [No abnormal lymph nodes are seen.] : no abnormal lymph nodes are seen [FreeTextEntry1] : 1.73 x 1.08 x 0.92 [FreeTextEntry5] : 3.97 x 2.55 x 3.55 [FreeTextEntry2] : 0.14 [FreeTextEntry3] : 3.97 x 2.55 x 3.48 [FreeTextEntry4] : Solid-Cystic ( 50/50)

## 2019-06-28 NOTE — HISTORY OF PRESENT ILLNESS
[FreeTextEntry1] : 72-year-old female for followup of hypothyroidism and thyroid nodule. \par \par The patient has a history of left thyroid nodule multicystic had fine needle aspiration that was nondiagnostic and repeat in july 2015.\par She had another FNA in 07/2018 which came back non-diagnostic and she did not have a repeat FNA after that. \par She denied any compressive symptoms \par \par She is also taking a stable dosage of levothyroxine 75 mcg every day om an empty stomach \par Reported that she has been having difficulty losing weight. \par \par Recently had diverticulitis and had colonic resection on 01/10/2019 \par

## 2019-06-28 NOTE — PHYSICAL EXAM
[Alert] : alert [No Acute Distress] : no acute distress [Well Nourished] : well nourished [Well Developed] : well developed [Normal Sclera/Conjunctiva] : normal sclera/conjunctiva [PERRL] : pupils equal, round and reactive to light [No Proptosis] : no proptosis [EOMI] : extra ocular movement intact [Normal Outer Ear/Nose] : the ears and nose were normal in appearance [Normal TMs] : both tympanic membranes were normal [Normal Hearing] : hearing was normal [Supple] : the neck was supple [No Neck Mass] : no neck mass was observed [Thyroid Not Enlarged] : the thyroid was not enlarged [No Respiratory Distress] : no respiratory distress [Clear to Auscultation] : lungs were clear to auscultation bilaterally [Normal Rate and Effort] : normal respiratory rhythm and effort [Normal Rate] : heart rate was normal  [Normal S1, S2] : normal S1 and S2 [Regular Rhythm] : with a regular rhythm [Normal Bowel Sounds] : normal bowel sounds [Not Tender] : non-tender [Soft] : abdomen soft [Not Distended] : not distended [No CVA Tenderness] : no ~M costovertebral angle tenderness [Normal Gait] : normal gait [No Joint Swelling] : no joint swelling seen [No Clubbing, Cyanosis] : no clubbing  or cyanosis of the fingernails [No Rash] : no rash [Normal Strength/Tone] : muscle strength and tone were normal [No Skin Lesions] : no skin lesions [No Motor Deficits] : the motor exam was normal [Normal Reflexes] : deep tendon reflexes were 2+ and symmetric [Oriented x3] : oriented to person, place, and time [Normal Insight/Judgement] : insight and judgment were intact [No Tremors] : no tremors [Normal Affect] : the affect was normal [Normal Mood] : the mood was normal [Kyphosis] : no kyphosis present [Foot Ulcers] : no foot ulcers [Scoliosis] : scoliosis not present [Acne] : no acne

## 2019-06-28 NOTE — IMPRESSION
[FreeTextEntry1] : Stable left sided nodule, will continue to observe [FreeTextEntry2] : Follow up in 6 months

## 2019-06-28 NOTE — REVIEW OF SYSTEMS
[Fatigue] : no fatigue [Decreased Appetite] : appetite not decreased [Recent Weight Gain (___ Lbs)] : no recent weight gain [Recent Weight Loss (___ Lbs)] : no recent weight loss [Visual Field Defect] : no visual field defect [Blurry Vision] : no blurred vision [Dry Eyes] : no dryness of the eyes [Eyes Itch] : no itching of the eyes [Dysphonia] : no dysphonia [Dysphagia] : no dysphagia [Neck Pain] : no neck pain [Nasal Congestion] : no nasal congestion [Palpitations] : no palpitations [Chest Pain] : no chest pain [Heart Rate Is Slow] : the heart rate was not slow [Heart Rate Is Fast] : the heart rate was not fast [Shortness Of Breath] : no shortness of breath [Wheezing] : no wheezing was heard [Cough] : no cough [SOB on Exertion] : no shortness of breath during exertion [Vomiting] : no vomiting was observed [Nausea] : no nausea [Constipation] : no constipation [Diarrhea] : no diarrhea [Polyuria] : no polyuria [Irregular Menses] : regular menses [Joint Stiffness] : no joint stiffness [Joint Pain] : no joint pain [Muscle Weakness] : no muscle weakness [Muscle Cramps] : no muscle cramps [Acanthosis] : no acanthosis  [Hirsutism] : no hirsutism [Acne] : no acne [Hair Loss] : no hair loss [Headache] : no headaches [Tremors] : no tremors [Dizziness] : no dizziness [Polydipsia] : no polydipsia [Depression] : no depression [Anxiety] : no anxiety [Galactorrhea] : no galactorrhea  [Cold Intolerance] : cold tolerant [Heat Intolerance] : heat tolerant [Easy Bleeding] : no ~M tendency for easy bleeding [Easy Bruising] : no tendency for easy bruising [Swelling] : no swelling

## 2019-06-28 NOTE — PHYSICAL EXAM
[Alert] : alert [No Acute Distress] : no acute distress [Well Nourished] : well nourished [Well Developed] : well developed [PERRL] : pupils equal, round and reactive to light [Normal Sclera/Conjunctiva] : normal sclera/conjunctiva [EOMI] : extra ocular movement intact [No Proptosis] : no proptosis [Normal Outer Ear/Nose] : the ears and nose were normal in appearance [Normal TMs] : both tympanic membranes were normal [Normal Hearing] : hearing was normal [Supple] : the neck was supple [No Neck Mass] : no neck mass was observed [Thyroid Not Enlarged] : the thyroid was not enlarged [No Respiratory Distress] : no respiratory distress [Normal Rate and Effort] : normal respiratory rhythm and effort [Clear to Auscultation] : lungs were clear to auscultation bilaterally [Normal Rate] : heart rate was normal  [Normal S1, S2] : normal S1 and S2 [Regular Rhythm] : with a regular rhythm [Normal Bowel Sounds] : normal bowel sounds [Not Tender] : non-tender [Soft] : abdomen soft [Not Distended] : not distended [No CVA Tenderness] : no ~M costovertebral angle tenderness [Normal Gait] : normal gait [No Joint Swelling] : no joint swelling seen [No Clubbing, Cyanosis] : no clubbing  or cyanosis of the fingernails [Normal Strength/Tone] : muscle strength and tone were normal [No Rash] : no rash [No Skin Lesions] : no skin lesions [No Motor Deficits] : the motor exam was normal [Normal Reflexes] : deep tendon reflexes were 2+ and symmetric [Oriented x3] : oriented to person, place, and time [Normal Insight/Judgement] : insight and judgment were intact [No Tremors] : no tremors [Normal Mood] : the mood was normal [Normal Affect] : the affect was normal [Kyphosis] : no kyphosis present [Foot Ulcers] : no foot ulcers [Scoliosis] : scoliosis not present [Acne] : no acne

## 2019-06-28 NOTE — PROCEDURE
[EPV [employer]AR e 2008 model, 10-12 MHz frequencies] : multiple real time longitudinal and transverse images were obtained using a high resolution ultrasound with a linear transducer, EPV SOLAR e 2008 model, 10-12 MHz frequencies. All measurements will be reported as longitudinal x marion-posterior x transverse. [Thyroid Nodule] : thyroid nodule [] : a homogenous parenchyma [Left Thyroid] : left [Mixed] : mixed [Mid] : mid pole there is a  [Indistinct] : indistinct [Ovoid] : ovoid in shape [No] : does not have a halo [No calcification] : no calcification [Peripheral vascularity] : peripheral vascularity [2] : 2 [No abnormal lymph nodes are seen.] : no abnormal lymph nodes are seen [FreeTextEntry1] : 1.73 x 1.08 x 0.92 [FreeTextEntry5] : 3.97 x 2.55 x 3.55 [FreeTextEntry2] : 0.14 [FreeTextEntry4] : Solid-Cystic ( 50/50) [FreeTextEntry3] : 3.97 x 2.55 x 3.48

## 2019-07-02 LAB
T4 FREE SERPL-MCNC: 1.4 NG/DL
TSH SERPL-ACNC: 0.37 UIU/ML

## 2019-12-30 ENCOUNTER — APPOINTMENT (OUTPATIENT)
Dept: ENDOCRINOLOGY | Facility: CLINIC | Age: 74
End: 2019-12-30
Payer: MEDICARE

## 2019-12-30 VITALS
HEART RATE: 74 BPM | DIASTOLIC BLOOD PRESSURE: 70 MMHG | WEIGHT: 146 LBS | SYSTOLIC BLOOD PRESSURE: 120 MMHG | HEIGHT: 62 IN | OXYGEN SATURATION: 97 % | BODY MASS INDEX: 26.87 KG/M2

## 2019-12-30 PROCEDURE — 76536 US EXAM OF HEAD AND NECK: CPT | Mod: 52

## 2019-12-30 PROCEDURE — 99214 OFFICE O/P EST MOD 30 MIN: CPT | Mod: 25

## 2019-12-30 NOTE — PHYSICAL EXAM
[Alert] : alert [Well Developed] : well developed [Normal Sclera/Conjunctiva] : normal sclera/conjunctiva [Well Nourished] : well nourished [PERRL] : pupils equal, round and reactive to light [No Respiratory Distress] : no respiratory distress [No Proptosis] : no proptosis [Normal S1, S2] : normal S1 and S2 [Clear to Auscultation] : lungs were clear to auscultation bilaterally [Regular Rhythm] : with a regular rhythm [Not Tender] : non-tender [No Edema] : there was no peripheral edema [Normal Bowel Sounds] : normal bowel sounds [Soft] : abdomen soft [Normal Gait] : normal gait [Oriented x3] : oriented to person, place, and time [No Tremors] : no tremors [Normal Affect] : the affect was normal [Normal Mood] : the mood was normal [Scoliosis] : scoliosis not present [Kyphosis] : no kyphosis present [de-identified] : thyroid enlarged, nontender

## 2019-12-30 NOTE — ASSESSMENT
[FreeTextEntry1] : 75 yo female with hypothyroidism and thyroid nodule \par \par Left Midpole thyroid Nodule \par - Previous biopsy in 2015 is benign. 07/2018 biopsy is non-diagnostic.\par - 6/2019 thyroid sono showed Thyroid nodule is 3.97 x 2.55 x 3.45 cm and is mixed solid cystic ( 50/50)\par - In office sono showed mixed solid cystic nodule of 5.4 x 1.66 x 3.55 cm, less than 20 % change in size \par \par Hypothyroidism: \par -Clinically and biochemically euthyroid , 6/2019 TSH 0.39\par - Will obtain TFT today \par -Will adjust dosing of levothyroxine as necessary (currently on Lt4 75 mcg x 6 days) \par \par Osteopenia \par -Distal 1/3 rd: -0.1, L1-L4: -1.9, Fem neck: -1.7\par -Minimal change from 2016\par -Continue on Vitamin D and calcium supplementation \par - repeat BMD 11/2020\par \par Pre DM \par - 4/2019 A1c 6.2\par -Recommend continue diet and exercise \par \par Follow up in 6 months. \par  \par

## 2019-12-30 NOTE — HISTORY OF PRESENT ILLNESS
[FreeTextEntry1] : 75 yo female for followup of hypothyroidism and thyroid nodule. \par \par The patient has a history of left thyroid nodule multicystic had fine needle aspiration that was nondiagnostic and repeat in july 2015.\par She had another FNA in 07/2018 which came back non-diagnostic and she did not have a repeat FNA after that. \par She denied any compressive symptoms.  \par \par She is also taking a stable dosage of levothyroxine 75 mcg qam 6 days a week ( does not take it on sunday) \par Does not report any cold intolerance, weight changes, constipation, fatigue.\par Reports daily exercise ( 45 mins daily of cardio and weight bearing exercise) and dietary changes. \par 4/2019 A1c 6.2\par \par Recently had diverticulitis and had colonic resection on 01/10/2019 \par \par

## 2019-12-30 NOTE — REVIEW OF SYSTEMS
[Recent Weight Gain (___ Lbs)] : no recent weight gain [Fatigue] : no fatigue [Decreased Appetite] : appetite not decreased [Recent Weight Loss (___ Lbs)] : no recent weight loss [Visual Field Defect] : no visual field defect [Dysphagia] : no dysphagia [Dysphonia] : no dysphonia [Neck Pain] : no neck pain [Shortness Of Breath] : no shortness of breath [Chest Pain] : no chest pain [Constipation] : no constipation [Nausea] : no nausea [Vomiting] : no vomiting was observed [Joint Pain] : no joint pain [Diarrhea] : no diarrhea [Headache] : no headaches [Dizziness] : no dizziness [Tremors] : no tremors [Heat Intolerance] : heat tolerant [Easy Bleeding] : no ~M tendency for easy bleeding [Cold Intolerance] : cold tolerant [Swelling] : no swelling

## 2020-01-03 ENCOUNTER — CLINICAL ADVICE (OUTPATIENT)
Age: 75
End: 2020-01-03

## 2020-01-03 LAB
T4 FREE SERPL-MCNC: 1.4 NG/DL
TSH SERPL-ACNC: 0.52 UIU/ML

## 2020-06-29 ENCOUNTER — APPOINTMENT (OUTPATIENT)
Dept: ENDOCRINOLOGY | Facility: CLINIC | Age: 75
End: 2020-06-29

## 2020-10-05 ENCOUNTER — APPOINTMENT (OUTPATIENT)
Dept: ENDOCRINOLOGY | Facility: CLINIC | Age: 75
End: 2020-10-05
Payer: MEDICARE

## 2020-10-05 VITALS
OXYGEN SATURATION: 98 % | TEMPERATURE: 98 F | BODY MASS INDEX: 27.23 KG/M2 | SYSTOLIC BLOOD PRESSURE: 118 MMHG | HEART RATE: 77 BPM | HEIGHT: 62 IN | DIASTOLIC BLOOD PRESSURE: 72 MMHG | WEIGHT: 148 LBS

## 2020-10-05 DIAGNOSIS — E78.5 HYPERLIPIDEMIA, UNSPECIFIED: ICD-10-CM

## 2020-10-05 PROCEDURE — 76536 US EXAM OF HEAD AND NECK: CPT

## 2020-10-05 PROCEDURE — 99214 OFFICE O/P EST MOD 30 MIN: CPT | Mod: 25

## 2020-10-05 NOTE — ASSESSMENT
[FreeTextEntry1] : 73 yo female with hypothyroidism and thyroid nodule here for follow up \par \par Left Midpole thyroid Nodule \par - Previous biopsy in 2015 is benign. 07/2018 biopsy is non-diagnostic.\par - In office sono showed mixed solid cystic nodule of 5.4 x 1.66 x 3.55 cm, less than 20 % change in size \par (12/2019) \par -5.4 x 2.48 x 3.57 cm ( 50 % solid/cystic) today \par -Will continue to observe every 6 months- decision made not to rebiopsy for now \par \par Hypothyroidism: \par -Clinically and biochemically euthyroid , 6/2019 TSH 0.39\par -Will obtain TFTs today \par -Will adjust dosing of levothyroxine as necessary (currently on Lt4 75 mcg x 6 days) \par \par Osteopenia \par -Distal 1/3 rd: -0.1, L1-L4: -1.9, Fem neck: -1.7\par -Minimal change from 2016\par -Continue on Vitamin D and calcium supplementation \par -Repeat BMD 11/2020\par \par Pre DM \par - 4/2019 A1c 6.2\par -Recommend continue diet and exercise \par \par Follow up in 6 months. \par  \par

## 2020-10-05 NOTE — ASSESSMENT
[FreeTextEntry1] : 75 yo female with hypothyroidism and thyroid nodule here for follow up \par \par Left Midpole thyroid Nodule \par - Previous biopsy in 2015 is benign. 07/2018 biopsy is non-diagnostic.\par - In office sono showed mixed solid cystic nodule of 5.4 x 1.66 x 3.55 cm, less than 20 % change in size \par (12/2019) \par -5.4 x 2.48 x 3.57 cm ( 50 % solid/cystic) today \par -Will continue to observe every 6 months- decision made not to rebiopsy for now \par \par Hypothyroidism: \par -Clinically and biochemically euthyroid , 6/2019 TSH 0.39\par -Will obtain TFTs today \par -Will adjust dosing of levothyroxine as necessary (currently on Lt4 75 mcg x 6 days) \par \par Osteopenia \par -Distal 1/3 rd: -0.1, L1-L4: -1.9, Fem neck: -1.7\par -Minimal change from 2016\par -Continue on Vitamin D and calcium supplementation \par -Repeat BMD 11/2020\par \par Pre DM \par - 4/2019 A1c 6.2\par -Recommend continue diet and exercise \par \par Follow up in 6 months. \par  \par

## 2020-10-05 NOTE — IMPRESSION
[FreeTextEntry1] : Left sided lobe filling nodule is displaying interval stability  [FreeTextEntry2] : Follow up in 6 months with ultrasound

## 2020-10-05 NOTE — PROCEDURE
[] : a homogenous parenchyma [No abnormal lymph nodes are seen.] : no abnormal lymph nodes are seen [FreeTextEntry1] : 3.06 x 1.09 x 1.14 [FreeTextEntry5] : 5.4 x 2.48 x 3.57 [FreeTextEntry2] : 0.17 [FreeTextEntry3] : 5.4 x 2.48 x 3.57

## 2020-10-05 NOTE — HISTORY OF PRESENT ILLNESS
[FreeTextEntry1] : 74 yo female for followup of hypothyroidism and thyroid nodule. \par \par The patient has a history of left thyroid nodule multicystic had fine needle aspiration that was nondiagnostic and repeat in july 2015.\par She had another FNA in 07/2018 which came back non-diagnostic and she did not have a repeat FNA after that. \par She denied any compressive symptoms. \par \par She is also taking a stable dosage of levothyroxine 75 mcg qam 6 days a week ( does not take it on sunday) \par Does not report any cold intolerance, weight changes, constipation, fatigue.\par Reports daily exercise ( 45 mins daily of cardio and weight bearing exercise) and dietary changes. \par 4/2019 A1c 6.2\par \par Recently had diverticulitis and had colonic resection on 01/10/2019 \par \par

## 2020-10-05 NOTE — REVIEW OF SYSTEMS
[Fatigue] : no fatigue [Decreased Appetite] : appetite not decreased [Recent Weight Gain (___ Lbs)] : no recent weight gain [Recent Weight Loss (___ Lbs)] : no recent weight loss [Visual Field Defect] : no visual field defect [Dry Eyes] : no dryness [Dysphagia] : no dysphagia [Neck Pain] : no neck pain [Dysphonia] : no dysphonia [Nasal Congestion] : no nasal congestion [Chest Pain] : no chest pain [Slow Heart Rate] : heart rate is not slow [Palpitations] : no palpitations [Fast Heart Rate] : heart rate is not fast [Shortness Of Breath] : no shortness of breath [Cough] : no cough [Nausea] : no nausea [Constipation] : no constipation [Vomiting] : no vomiting [Diarrhea] : no diarrhea [Polyuria] : no polyuria [Irregular Menses] : regular menses [Joint Pain] : no joint pain [Muscle Weakness] : no muscle weakness [Acanthosis] : no acanthosis  [Headaches] : no headaches [Dizziness] : no dizziness [Tremors] : no tremors [Pain/Numbness of Digits] : no pain/numbness of digits [Depression] : no depression [Polydipsia] : no polydipsia [Cold Intolerance] : no cold intolerance [Easy Bleeding] : no ~M tendency for easy bleeding [Easy Bruising] : no tendency for easy bruising

## 2020-10-05 NOTE — PHYSICAL EXAM
[Alert] : alert [Well Nourished] : well nourished [No Acute Distress] : no acute distress [Normal Sclera/Conjunctiva] : normal sclera/conjunctiva [EOMI] : extra ocular movement intact [PERRL] : pupils equal, round and reactive to light [No Proptosis] : no proptosis [Normal Outer Ear/Nose] : the ears and nose were normal in appearance [Normal TMs] : both tympanic membranes were normal [No Neck Mass] : no neck mass was observed [Thyroid Not Enlarged] : the thyroid was not enlarged [No Respiratory Distress] : no respiratory distress [Clear to Auscultation] : lungs were clear to auscultation bilaterally [Normal S1, S2] : normal S1 and S2 [Normal Rate] : heart rate was normal [Regular Rhythm] : with a regular rhythm [Normal Bowel Sounds] : normal bowel sounds [Not Tender] : non-tender [Soft] : abdomen soft [No CVA Tenderness] : no ~M costovertebral angle tenderness [Normal Gait] : normal gait [No Clubbing, Cyanosis] : no clubbing  or cyanosis of the fingernails [No Joint Swelling] : no joint swelling seen [Normal Strength/Tone] : muscle strength and tone were normal [No Rash] : no rash [No Skin Lesions] : no skin lesions [No Motor Deficits] : the motor exam was normal [Normal Reflexes] : deep tendon reflexes were 2+ and symmetric [No Tremors] : no tremors [Oriented x3] : oriented to person, place, and time [Normal Affect] : the affect was normal [Normal Insight/Judgement] : insight and judgment were intact [Normal Mood] : the mood was normal

## 2020-10-06 LAB
25(OH)D3 SERPL-MCNC: 36.1 NG/ML
ALBUMIN SERPL ELPH-MCNC: 4.4 G/DL
ALP BLD-CCNC: 82 U/L
ALT SERPL-CCNC: 18 U/L
ANION GAP SERPL CALC-SCNC: 13 MMOL/L
AST SERPL-CCNC: 18 U/L
BILIRUB SERPL-MCNC: 0.8 MG/DL
BUN SERPL-MCNC: 13 MG/DL
CALCIUM SERPL-MCNC: 9.8 MG/DL
CHLORIDE SERPL-SCNC: 100 MMOL/L
CHOLEST SERPL-MCNC: 196 MG/DL
CHOLEST/HDLC SERPL: 3 RATIO
CO2 SERPL-SCNC: 27 MMOL/L
CREAT SERPL-MCNC: 0.83 MG/DL
CREAT SPEC-SCNC: 121 MG/DL
ESTIMATED AVERAGE GLUCOSE: 137 MG/DL
GLUCOSE SERPL-MCNC: 110 MG/DL
HBA1C MFR BLD HPLC: 6.4 %
HDLC SERPL-MCNC: 65 MG/DL
LDLC SERPL CALC-MCNC: 105 MG/DL
MICROALBUMIN 24H UR DL<=1MG/L-MCNC: <1.2 MG/DL
MICROALBUMIN/CREAT 24H UR-RTO: NORMAL MG/G
POTASSIUM SERPL-SCNC: 4.1 MMOL/L
PROT SERPL-MCNC: 7.1 G/DL
SODIUM SERPL-SCNC: 140 MMOL/L
T4 FREE SERPL-MCNC: 1.4 NG/DL
TRIGL SERPL-MCNC: 130 MG/DL
TSH SERPL-ACNC: 0.57 UIU/ML

## 2021-04-04 ENCOUNTER — RX RENEWAL (OUTPATIENT)
Age: 76
End: 2021-04-04

## 2021-04-08 ENCOUNTER — APPOINTMENT (OUTPATIENT)
Dept: ENDOCRINOLOGY | Facility: CLINIC | Age: 76
End: 2021-04-08
Payer: MEDICARE

## 2021-04-08 VITALS
BODY MASS INDEX: 27.05 KG/M2 | DIASTOLIC BLOOD PRESSURE: 60 MMHG | HEIGHT: 62 IN | SYSTOLIC BLOOD PRESSURE: 112 MMHG | OXYGEN SATURATION: 96 % | HEART RATE: 75 BPM | TEMPERATURE: 97.8 F | WEIGHT: 147 LBS

## 2021-04-08 PROCEDURE — 99214 OFFICE O/P EST MOD 30 MIN: CPT | Mod: 25

## 2021-04-08 PROCEDURE — 76536 US EXAM OF HEAD AND NECK: CPT

## 2021-04-08 NOTE — HISTORY OF PRESENT ILLNESS
[FreeTextEntry1] : 76 yo female for followup of hypothyroidism and thyroid nodule. \par \par The patient has a history of left thyroid nodule multicystic had fine needle aspiration that was nondiagnostic and repeat in july 2015.\par She had another FNA in 07/2018 which came back non-diagnostic and she did not have a repeat FNA after that. \par She denied any compressive symptoms. \par \par She is also taking a stable dosage of levothyroxine 75 mcg qam 6 days a week ( does not take it on sunday) \par Does not report any cold intolerance, weight changes, constipation, fatigue.\par Reports daily exercise ( 45 mins daily of cardio and weight bearing exercise) and dietary changes. \par 4/2019 A1c 6.2\par \par Recently had diverticulitis and had colonic resection on 01/10/2019 \par \par \par

## 2021-04-08 NOTE — IMPRESSION
[FreeTextEntry1] : Left sided dominant 5.59 cm nodule displaying interval stability  [FreeTextEntry2] : Follow up ultrasound in 6 months

## 2021-04-08 NOTE — ASSESSMENT
[FreeTextEntry1] : 76 yo female with hypothyroidism and thyroid nodule here for follow up \par \par Left Midpole thyroid Nodule \par - Previous biopsy in 2015 is benign. 07/2018 biopsy is non-diagnostic.\par - In office sonogram showed mixed solid cystic nodule of 5.59 x 2.96 x 3.38 cm, less than 20 % change in size \par -Will continue to observe every 6 months- decision made not to rebiopsy for now \par \par Hypothyroidism: \par -Clinically and biochemically euthyroid , 6/2019 TSH 0.39\par -Will obtain TFTs today \par -Will adjust dosing of levothyroxine as necessary (currently on Lt4 75 mcg x 6 days) \par \par Osteopenia \par -Distal 1/3 rd: -0.1, L1-L4: -1.9, Fem neck: -1.7\par -Minimal change from 2016\par -Continue on Vitamin D and calcium supplementation \par -Repeat BMD today\par \par Pre DM \par - 4/2019 A1c 6.2\par -Recommend continue diet and exercise \par \par Follow up in 6 months.

## 2021-04-08 NOTE — HISTORY OF PRESENT ILLNESS
[FreeTextEntry1] : 74 yo female for followup of hypothyroidism and thyroid nodule. \par \par The patient has a history of left thyroid nodule multicystic had fine needle aspiration that was nondiagnostic and repeat in july 2015.\par She had another FNA in 07/2018 which came back non-diagnostic and she did not have a repeat FNA after that. \par She denied any compressive symptoms. \par \par She is also taking a stable dosage of levothyroxine 75 mcg qam 6 days a week ( does not take it on sunday) \par Does not report any cold intolerance, weight changes, constipation, fatigue.\par Reports daily exercise ( 45 mins daily of cardio and weight bearing exercise) and dietary changes. \par 4/2019 A1c 6.2\par \par Recently had diverticulitis and had colonic resection on 01/10/2019 \par \par \par

## 2021-04-08 NOTE — PROCEDURE
[[employer] e 2008 model, 10-12 MHz frequencies] : multiple real time longitudinal and transverse images were obtained using a high resolution ultrasound with a linear transducer, [employer] e 2008 model, 10-12 MHz frequencies. All measurements will be reported as longitudinal x marion-posterior x transverse. [] : a homogenous parenchyma [Left Thyroid] : left [Mixed] : mixed [Predominantly Solid] : predominantly solid [Isoechoic solid component] : with isoechoic solid component [Ovoid] : ovoid in shape [Indistinct] : indistinct [No] : does not have a halo [No calcification] : no calcification [Peripheral vascularity] : peripheral vascularity [2] : 2 [FreeTextEntry1] : 1.68 x 0.96 x 1.13 [FreeTextEntry5] : 6.0 x 2.79 x 3.85 [FreeTextEntry2] : 0.22 [FreeTextEntry3] : 5.59 x 2.96 x 3.38

## 2021-04-08 NOTE — PROCEDURE
[iMI[employer]Q e 2008 model, 10-12 MHz frequencies] : multiple real time longitudinal and transverse images were obtained using a high resolution ultrasound with a linear transducer, iMICROQ e 2008 model, 10-12 MHz frequencies. All measurements will be reported as longitudinal x marion-posterior x transverse. [] : a homogenous parenchyma [Left Thyroid] : left [Mixed] : mixed [Predominantly Solid] : predominantly solid [Isoechoic solid component] : with isoechoic solid component [Ovoid] : ovoid in shape [Indistinct] : indistinct [No] : does not have a halo [No calcification] : no calcification [Peripheral vascularity] : peripheral vascularity [2] : 2 [FreeTextEntry1] : 1.68 x 0.96 x 1.13 [FreeTextEntry5] : 6.0 x 2.79 x 3.85 [FreeTextEntry2] : 0.22 [FreeTextEntry3] : 5.59 x 2.96 x 3.38

## 2021-04-13 ENCOUNTER — RX RENEWAL (OUTPATIENT)
Age: 76
End: 2021-04-13

## 2021-04-18 LAB
25(OH)D3 SERPL-MCNC: 44.7 NG/ML
ANION GAP SERPL CALC-SCNC: 10 MMOL/L
BUN SERPL-MCNC: 16 MG/DL
CALCIUM SERPL-MCNC: 9.9 MG/DL
CHLORIDE SERPL-SCNC: 105 MMOL/L
CO2 SERPL-SCNC: 26 MMOL/L
CREAT SERPL-MCNC: 0.83 MG/DL
GLUCOSE SERPL-MCNC: 118 MG/DL
POTASSIUM SERPL-SCNC: 4.4 MMOL/L
SODIUM SERPL-SCNC: 141 MMOL/L
T4 FREE SERPL-MCNC: 1.4 NG/DL
TSH SERPL-ACNC: 0.51 UIU/ML

## 2021-07-19 NOTE — PROGRESS NOTE ADULT - PROVIDER SPECIALTY LIST ADULT
Surgery [___ yrs] : [unfilled] year(s) ago [3] : an average pain level of 3/10 [Bending] : worsened by bending [de-identified] : NICHELLE PHILLIPS is a 53 year male being seen for f/u bilat knee pain. He presents for L knee Orthovisc injection (1/3).

## 2021-10-11 ENCOUNTER — APPOINTMENT (OUTPATIENT)
Dept: ENDOCRINOLOGY | Facility: CLINIC | Age: 76
End: 2021-10-11
Payer: MEDICARE

## 2021-10-11 VITALS
DIASTOLIC BLOOD PRESSURE: 72 MMHG | TEMPERATURE: 97.8 F | WEIGHT: 150 LBS | HEIGHT: 61 IN | OXYGEN SATURATION: 98 % | SYSTOLIC BLOOD PRESSURE: 128 MMHG | HEART RATE: 78 BPM | BODY MASS INDEX: 28.32 KG/M2

## 2021-10-11 VITALS — BODY MASS INDEX: 28.32 KG/M2 | HEIGHT: 61 IN | TEMPERATURE: 97.8 F | WEIGHT: 150 LBS

## 2021-10-11 DIAGNOSIS — Z00.00 ENCOUNTER FOR GENERAL ADULT MEDICAL EXAMINATION W/OUT ABNORMAL FINDINGS: ICD-10-CM

## 2021-10-11 PROCEDURE — 77085 DXA BONE DENSITY AXL VRT FX: CPT | Mod: GA

## 2021-10-11 PROCEDURE — ZZZZZ: CPT

## 2021-10-11 PROCEDURE — 76536 US EXAM OF HEAD AND NECK: CPT | Mod: 52

## 2021-10-11 PROCEDURE — 99214 OFFICE O/P EST MOD 30 MIN: CPT | Mod: 25

## 2021-10-11 NOTE — REVIEW OF SYSTEMS
[Fatigue] : no fatigue [Decreased Appetite] : appetite not decreased [Recent Weight Gain (___ Lbs)] : no recent weight gain [Recent Weight Loss (___ Lbs)] : no recent weight loss [Visual Field Defect] : no visual field defect [Dry Eyes] : no dryness [Dysphagia] : no dysphagia [Neck Pain] : no neck pain [Dysphonia] : no dysphonia [Nasal Congestion] : no nasal congestion [Slow Heart Rate] : heart rate is not slow [Chest Pain] : no chest pain [Palpitations] : no palpitations [Fast Heart Rate] : heart rate is not fast [Shortness Of Breath] : no shortness of breath [Cough] : no cough [Nausea] : no nausea [Constipation] : no constipation [Vomiting] : no vomiting [Diarrhea] : no diarrhea [Polyuria] : no polyuria [Irregular Menses] : regular menses [Joint Pain] : no joint pain [Muscle Weakness] : no muscle weakness [Acanthosis] : no acanthosis  [Acne] : no acne [Headaches] : no headaches [Dizziness] : no dizziness [Tremors] : no tremors [Pain/Numbness of Digits] : no pain/numbness of digits [Depression] : no depression [Polydipsia] : no polydipsia [Cold Intolerance] : no cold intolerance [Easy Bleeding] : no ~M tendency for easy bleeding [Easy Bruising] : no tendency for easy bruising

## 2021-10-11 NOTE — HISTORY OF PRESENT ILLNESS
[FreeTextEntry1] : 77 yo female for followup of hypothyroidism and thyroid nodule. \par \par The patient has a history of left thyroid nodule multicystic had fine needle aspiration that was nondiagnostic and repeat in july 2015.\par She had another FNA in 07/2018 which came back non-diagnostic and she did not have a repeat FNA after that. \par She denied any compressive symptoms. \par \par She is also taking a stable dosage of levothyroxine 75 mcg qam 6 days a week ( does not take it on sunday) \par Does not report any cold intolerance, weight changes, constipation, fatigue.\par Reports daily exercise ( 45 mins daily of cardio and weight bearing exercise) and dietary changes. x 3 days per week \par 4/2019 A1c 6.2\par \par Recently had diverticulitis and had colonic resection on 01/10/2019 \par

## 2021-10-11 NOTE — ASSESSMENT
[FreeTextEntry1] : \par 77 yo female with hypothyroidism and thyroid nodule here for follow up \par \par Left Midpole thyroid Nodule \par - Previous biopsy in 2015 is benign. 07/2018 biopsy is non-diagnostic.\par - In office sonogram showed mixed solid cystic nodule of 5.75 x 2.57 x 3.54 cm, less than 20 % change in size \par -Will continue to observe every 6 months- decision made not to rebiopsy for now \par \par Hypothyroidism: \par -Clinically and biochemically euthyroid , 6/2019 TSH 0.39\par -Will obtain TFTs today \par -Will adjust dosing of levothyroxine as necessary (currently on Lt4 75 mcg x 6 days) \par \par Osteopenia \par -Distal 1/3 rd: 0.1, L1-L4: -1.3, Fem neck: -1.4\par -Continue on Vitamin D and calcium supplementation \par -Repeat BMD in 2-3 years \par \par Pre DM \par - 4/2019 A1c 6.2\par -Recommend continue diet and exercise \par \par Follow up in 6 months. \par

## 2021-10-12 LAB
25(OH)D3 SERPL-MCNC: 39.8 NG/ML
ALBUMIN SERPL ELPH-MCNC: 4.3 G/DL
ANION GAP SERPL CALC-SCNC: 11 MMOL/L
BUN SERPL-MCNC: 9 MG/DL
CALCIUM SERPL-MCNC: 9.6 MG/DL
CHLORIDE SERPL-SCNC: 100 MMOL/L
CO2 SERPL-SCNC: 28 MMOL/L
CREAT SERPL-MCNC: 0.82 MG/DL
GLUCOSE SERPL-MCNC: 106 MG/DL
POTASSIUM SERPL-SCNC: 4.4 MMOL/L
SODIUM SERPL-SCNC: 138 MMOL/L
T4 FREE SERPL-MCNC: 1.3 NG/DL
TSH SERPL-ACNC: 0.59 UIU/ML

## 2021-11-22 ENCOUNTER — APPOINTMENT (OUTPATIENT)
Dept: DERMATOLOGY | Facility: CLINIC | Age: 76
End: 2021-11-22

## 2022-04-25 ENCOUNTER — APPOINTMENT (OUTPATIENT)
Dept: ENDOCRINOLOGY | Facility: CLINIC | Age: 77
End: 2022-04-25
Payer: MEDICARE

## 2022-04-25 VITALS
DIASTOLIC BLOOD PRESSURE: 82 MMHG | OXYGEN SATURATION: 96 % | SYSTOLIC BLOOD PRESSURE: 130 MMHG | TEMPERATURE: 97.3 F | HEART RATE: 72 BPM | BODY MASS INDEX: 28.32 KG/M2 | HEIGHT: 61 IN | WEIGHT: 150 LBS

## 2022-04-25 PROCEDURE — 76536 US EXAM OF HEAD AND NECK: CPT | Mod: 52

## 2022-04-25 PROCEDURE — 99214 OFFICE O/P EST MOD 30 MIN: CPT | Mod: 25

## 2022-04-25 NOTE — ASSESSMENT
[FreeTextEntry1] : \par 75 yo female with hypothyroidism and thyroid nodule here for follow up \par \par Left Midpole thyroid Nodule \par - Previous biopsy in 2015 is benign. 07/2018 biopsy is non-diagnostic.\par - In office sonogram showed mixed solid cystic nodule of 5.75 x 2.57 x 3.54 cm, less than 20 % change in size \par -Will continue to observe every 6 months- decision made not to rebiopsy for now \par \par Hypothyroidism: \par -Clinically and biochemically euthyroid , 6/2019 TSH 0.39\par -Will obtain TFTs today \par -Will adjust dosing of levothyroxine as necessary (currently on Lt4 75 mcg x 6 days) \par \par Osteopenia \par -Distal 1/3 rd: 0.1, L1-L4: -1.3, Fem neck: -1.4\par -Continue on Vitamin D and calcium supplementation \par -Repeat BMD in 2-3 years \par \par Pre DM \par - 4/2019 A1c 6.2\par -Recommend continue diet and exercise \par \par Follow up in 6 months. \par

## 2022-04-25 NOTE — HISTORY OF PRESENT ILLNESS
[FreeTextEntry1] : 75 yo female for followup of hypothyroidism and thyroid nodule. \par \par The patient has a history of left thyroid nodule multicystic had fine needle aspiration that was nondiagnostic and repeat in july 2015.\par She had another FNA in 07/2018 which came back non-diagnostic and she did not have a repeat FNA after that. \par She denied any compressive symptoms. \par \par She is also taking a stable dosage of levothyroxine 75 mcg qam 6 days a week ( does not take it on sunday) \par Does not report any cold intolerance, weight changes, constipation, fatigue.\par Reports daily exercise ( 45 mins daily of cardio and weight bearing exercise) and dietary changes. x 3 days per week \par 4/2019 A1c 6.2\par \par Recently had diverticulitis and had colonic resection on 01/10/2019 \par

## 2022-05-04 ENCOUNTER — RX RENEWAL (OUTPATIENT)
Age: 77
End: 2022-05-04

## 2022-05-30 LAB
ANION GAP SERPL CALC-SCNC: 13 MMOL/L
BUN SERPL-MCNC: 10 MG/DL
CALCIUM SERPL-MCNC: 9.9 MG/DL
CHLORIDE SERPL-SCNC: 103 MMOL/L
CHOLEST SERPL-MCNC: 211 MG/DL
CO2 SERPL-SCNC: 26 MMOL/L
CREAT SERPL-MCNC: 0.81 MG/DL
EGFR: 75 ML/MIN/1.73M2
ESTIMATED AVERAGE GLUCOSE: 140 MG/DL
GLUCOSE SERPL-MCNC: 96 MG/DL
HBA1C MFR BLD HPLC: 6.5 %
HDLC SERPL-MCNC: 63 MG/DL
LDLC SERPL CALC-MCNC: 114 MG/DL
NONHDLC SERPL-MCNC: 149 MG/DL
POTASSIUM SERPL-SCNC: 4.3 MMOL/L
SODIUM SERPL-SCNC: 142 MMOL/L
T4 FREE SERPL-MCNC: 1.4 NG/DL
TRIGL SERPL-MCNC: 172 MG/DL
TSH SERPL-ACNC: 0.72 UIU/ML

## 2023-01-27 ENCOUNTER — APPOINTMENT (OUTPATIENT)
Dept: ENDOCRINOLOGY | Facility: CLINIC | Age: 78
End: 2023-01-27
Payer: MEDICARE

## 2023-01-27 VITALS
HEIGHT: 61 IN | WEIGHT: 147 LBS | HEART RATE: 72 BPM | BODY MASS INDEX: 27.75 KG/M2 | DIASTOLIC BLOOD PRESSURE: 80 MMHG | OXYGEN SATURATION: 96 % | SYSTOLIC BLOOD PRESSURE: 126 MMHG

## 2023-01-27 DIAGNOSIS — R73.03 PREDIABETES.: ICD-10-CM

## 2023-01-27 DIAGNOSIS — E04.1 NONTOXIC SINGLE THYROID NODULE: ICD-10-CM

## 2023-01-27 DIAGNOSIS — E03.9 HYPOTHYROIDISM, UNSPECIFIED: ICD-10-CM

## 2023-01-27 DIAGNOSIS — M85.80 OTHER SPECIFIED DISORDERS OF BONE DENSITY AND STRUCTURE, UNSPECIFIED SITE: ICD-10-CM

## 2023-01-27 PROCEDURE — 99214 OFFICE O/P EST MOD 30 MIN: CPT | Mod: 25

## 2023-01-27 PROCEDURE — 76536 US EXAM OF HEAD AND NECK: CPT

## 2023-01-27 NOTE — ASSESSMENT
[FreeTextEntry1] : \par 78 yo female with hypothyroidism and thyroid nodule here for follow up \par \par Left Midpole thyroid Nodule \par - Previous biopsy in 2015 is benign. 07/2018 biopsy is non-diagnostic.\par - In office sonogram showed mixed solid cystic nodule of 6.0 x 3.08 x 3.84 cm\par -Will continue to observe every 6 months- decision made not to rebiopsy for now \par \par Hypothyroidism: \par -Clinically and biochemically euthyroid , 6/2019 TSH 0.39\par -Will obtain TFTs today \par -Will adjust dosing of levothyroxine as necessary (currently on Lt4 75 mcg x 6 days) \par \par Osteopenia \par -Distal 1/3 rd: 0.1, L1-L4: -1.3, Fem neck: -1.4\par -Continue on Vitamin D and calcium supplementation \par -Repeat BMD in 2-3 years \par \par Pre-DM \par - 4/2019 A1c 6.2\par -Recommend continue diet and exercise \par \par Follow up in 6 months. \par

## 2023-01-27 NOTE — HISTORY OF PRESENT ILLNESS
[FreeTextEntry1] : 76 yo female for followup of hypothyroidism and thyroid nodule. \par \par The patient has a history of left thyroid nodule multicystic had fine needle aspiration that was nondiagnostic and repeat in july 2015.\par She had another FNA in 07/2018 which came back non-diagnostic and she did not have a repeat FNA after that. \par She denied any compressive symptoms. \par \par She is also taking a stable dosage of levothyroxine 75 mcg qam 6 days a week ( does not take it on sunday) \par Does not report any cold intolerance, weight changes, constipation, fatigue.\par Reports daily exercise ( 45 mins daily of cardio and weight bearing exercise) and dietary changes. x 3 days per week \par 4/2019 A1c 6.2\par \par Recently had diverticulitis and had colonic resection on 01/10/2019 \par

## 2023-01-27 NOTE — REVIEW OF SYSTEMS
[Fatigue] : no fatigue [Decreased Appetite] : appetite not decreased [Visual Field Defect] : no visual field defect [Dysphagia] : no dysphagia [Dysphonia] : no dysphonia [Chest Pain] : no chest pain [Slow Heart Rate] : heart rate is not slow [Palpitations] : no palpitations [Fast Heart Rate] : heart rate is not fast [Shortness Of Breath] : no shortness of breath [Nausea] : no nausea [Constipation] : no constipation [Vomiting] : no vomiting [Polyuria] : no polyuria [Irregular Menses] : regular menses [Joint Pain] : no joint pain [Muscle Weakness] : no muscle weakness [Acanthosis] : no acanthosis  [Acne] : no acne [Headaches] : no headaches [Dizziness] : no dizziness [Tremors] : no tremors [Pain/Numbness of Digits] : no pain/numbness of digits [Depression] : no depression [Polydipsia] : no polydipsia [Cold Intolerance] : no cold intolerance [Easy Bleeding] : no ~M tendency for easy bleeding [Easy Bruising] : no tendency for easy bruising

## 2023-02-13 LAB
ANION GAP SERPL CALC-SCNC: 12 MMOL/L
BUN SERPL-MCNC: 12 MG/DL
CALCIUM SERPL-MCNC: 10.2 MG/DL
CHLORIDE SERPL-SCNC: 100 MMOL/L
CHOLEST SERPL-MCNC: 184 MG/DL
CO2 SERPL-SCNC: 28 MMOL/L
CREAT SERPL-MCNC: 0.86 MG/DL
EGFR: 69 ML/MIN/1.73M2
ESTIMATED AVERAGE GLUCOSE: 146 MG/DL
GLUCOSE SERPL-MCNC: 97 MG/DL
HBA1C MFR BLD HPLC: 6.7 %
HDLC SERPL-MCNC: 63 MG/DL
LDLC SERPL CALC-MCNC: 96 MG/DL
NONHDLC SERPL-MCNC: 122 MG/DL
POTASSIUM SERPL-SCNC: 4.2 MMOL/L
SODIUM SERPL-SCNC: 140 MMOL/L
T4 FREE SERPL-MCNC: 1.5 NG/DL
THYROGLOB AB SERPL-ACNC: <20 IU/ML
THYROPEROXIDASE AB SERPL IA-ACNC: <10 IU/ML
TRIGL SERPL-MCNC: 127 MG/DL
TSH SERPL-ACNC: 0.36 UIU/ML

## 2023-04-06 ENCOUNTER — EMERGENCY (EMERGENCY)
Facility: HOSPITAL | Age: 78
LOS: 0 days | Discharge: HOME | End: 2023-04-06
Attending: STUDENT IN AN ORGANIZED HEALTH CARE EDUCATION/TRAINING PROGRAM
Payer: MEDICARE

## 2023-04-06 VITALS
SYSTOLIC BLOOD PRESSURE: 135 MMHG | TEMPERATURE: 98 F | HEART RATE: 93 BPM | RESPIRATION RATE: 18 BRPM | OXYGEN SATURATION: 95 % | DIASTOLIC BLOOD PRESSURE: 85 MMHG

## 2023-04-06 VITALS
DIASTOLIC BLOOD PRESSURE: 65 MMHG | RESPIRATION RATE: 18 BRPM | TEMPERATURE: 98 F | SYSTOLIC BLOOD PRESSURE: 160 MMHG | OXYGEN SATURATION: 99 % | HEART RATE: 74 BPM

## 2023-04-06 DIAGNOSIS — Z90.89 ACQUIRED ABSENCE OF OTHER ORGANS: Chronic | ICD-10-CM

## 2023-04-06 DIAGNOSIS — Z90.89 ACQUIRED ABSENCE OF OTHER ORGANS: ICD-10-CM

## 2023-04-06 DIAGNOSIS — Z87.19 PERSONAL HISTORY OF OTHER DISEASES OF THE DIGESTIVE SYSTEM: ICD-10-CM

## 2023-04-06 DIAGNOSIS — R73.03 PREDIABETES: ICD-10-CM

## 2023-04-06 DIAGNOSIS — R10.13 EPIGASTRIC PAIN: ICD-10-CM

## 2023-04-06 DIAGNOSIS — R10.9 UNSPECIFIED ABDOMINAL PAIN: ICD-10-CM

## 2023-04-06 DIAGNOSIS — E78.5 HYPERLIPIDEMIA, UNSPECIFIED: ICD-10-CM

## 2023-04-06 DIAGNOSIS — R11.2 NAUSEA WITH VOMITING, UNSPECIFIED: ICD-10-CM

## 2023-04-06 DIAGNOSIS — E03.9 HYPOTHYROIDISM, UNSPECIFIED: ICD-10-CM

## 2023-04-06 DIAGNOSIS — Z79.82 LONG TERM (CURRENT) USE OF ASPIRIN: ICD-10-CM

## 2023-04-06 DIAGNOSIS — Z98.891 HISTORY OF UTERINE SCAR FROM PREVIOUS SURGERY: Chronic | ICD-10-CM

## 2023-04-06 LAB
ALBUMIN SERPL ELPH-MCNC: 4 G/DL — SIGNIFICANT CHANGE UP (ref 3.3–5)
ALP SERPL-CCNC: 93 U/L — SIGNIFICANT CHANGE UP (ref 40–120)
ALT FLD-CCNC: 24 U/L — SIGNIFICANT CHANGE UP (ref 12–78)
ANION GAP SERPL CALC-SCNC: 4 MMOL/L — LOW (ref 5–17)
APPEARANCE UR: CLEAR — SIGNIFICANT CHANGE UP
APTT BLD: 29.8 SEC — SIGNIFICANT CHANGE UP (ref 27.5–35.5)
AST SERPL-CCNC: 15 U/L — SIGNIFICANT CHANGE UP (ref 15–37)
B-OH-BUTYR SERPL-SCNC: 0.3 MMOL/L — SIGNIFICANT CHANGE UP
BACTERIA # UR AUTO: ABNORMAL
BASOPHILS # BLD AUTO: 0.05 K/UL — SIGNIFICANT CHANGE UP (ref 0–0.2)
BASOPHILS NFR BLD AUTO: 0.6 % — SIGNIFICANT CHANGE UP (ref 0–2)
BILIRUB SERPL-MCNC: 0.9 MG/DL — SIGNIFICANT CHANGE UP (ref 0.2–1.2)
BILIRUB UR-MCNC: NEGATIVE — SIGNIFICANT CHANGE UP
BLD GP AB SCN SERPL QL: SIGNIFICANT CHANGE UP
BUN SERPL-MCNC: 11 MG/DL — SIGNIFICANT CHANGE UP (ref 7–23)
CALCIUM SERPL-MCNC: 10.7 MG/DL — HIGH (ref 8.5–10.1)
CHLORIDE SERPL-SCNC: 101 MMOL/L — SIGNIFICANT CHANGE UP (ref 96–108)
CO2 SERPL-SCNC: 30 MMOL/L — SIGNIFICANT CHANGE UP (ref 22–31)
COLOR SPEC: YELLOW — SIGNIFICANT CHANGE UP
CREAT SERPL-MCNC: 0.84 MG/DL — SIGNIFICANT CHANGE UP (ref 0.5–1.3)
DIFF PNL FLD: ABNORMAL
EGFR: 71 ML/MIN/1.73M2 — SIGNIFICANT CHANGE UP
EOSINOPHIL # BLD AUTO: 0.04 K/UL — SIGNIFICANT CHANGE UP (ref 0–0.5)
EOSINOPHIL NFR BLD AUTO: 0.4 % — SIGNIFICANT CHANGE UP (ref 0–6)
EPI CELLS # UR: SIGNIFICANT CHANGE UP
GLUCOSE SERPL-MCNC: 135 MG/DL — HIGH (ref 70–99)
GLUCOSE UR QL: NEGATIVE — SIGNIFICANT CHANGE UP
HCT VFR BLD CALC: 46.3 % — HIGH (ref 34.5–45)
HGB BLD-MCNC: 15.6 G/DL — HIGH (ref 11.5–15.5)
IMM GRANULOCYTES NFR BLD AUTO: 0.2 % — SIGNIFICANT CHANGE UP (ref 0–0.9)
INR BLD: 0.99 RATIO — SIGNIFICANT CHANGE UP (ref 0.88–1.16)
KETONES UR-MCNC: ABNORMAL
LACTATE SERPL-SCNC: 1 MMOL/L — SIGNIFICANT CHANGE UP (ref 0.7–2)
LEUKOCYTE ESTERASE UR-ACNC: ABNORMAL
LIDOCAIN IGE QN: 115 U/L — SIGNIFICANT CHANGE UP (ref 73–393)
LYMPHOCYTES # BLD AUTO: 1.68 K/UL — SIGNIFICANT CHANGE UP (ref 1–3.3)
LYMPHOCYTES # BLD AUTO: 18.7 % — SIGNIFICANT CHANGE UP (ref 13–44)
MAGNESIUM SERPL-MCNC: 1.9 MG/DL — SIGNIFICANT CHANGE UP (ref 1.6–2.6)
MCHC RBC-ENTMCNC: 30.2 PG — SIGNIFICANT CHANGE UP (ref 27–34)
MCHC RBC-ENTMCNC: 33.7 GM/DL — SIGNIFICANT CHANGE UP (ref 32–36)
MCV RBC AUTO: 89.6 FL — SIGNIFICANT CHANGE UP (ref 80–100)
MONOCYTES # BLD AUTO: 0.38 K/UL — SIGNIFICANT CHANGE UP (ref 0–0.9)
MONOCYTES NFR BLD AUTO: 4.2 % — SIGNIFICANT CHANGE UP (ref 2–14)
NEUTROPHILS # BLD AUTO: 6.8 K/UL — SIGNIFICANT CHANGE UP (ref 1.8–7.4)
NEUTROPHILS NFR BLD AUTO: 75.9 % — SIGNIFICANT CHANGE UP (ref 43–77)
NITRITE UR-MCNC: NEGATIVE — SIGNIFICANT CHANGE UP
PH UR: 6 — SIGNIFICANT CHANGE UP (ref 5–8)
PHOSPHATE SERPL-MCNC: 3.2 MG/DL — SIGNIFICANT CHANGE UP (ref 2.5–4.5)
PLATELET # BLD AUTO: 308 K/UL — SIGNIFICANT CHANGE UP (ref 150–400)
POTASSIUM SERPL-MCNC: 4 MMOL/L — SIGNIFICANT CHANGE UP (ref 3.5–5.3)
POTASSIUM SERPL-SCNC: 4 MMOL/L — SIGNIFICANT CHANGE UP (ref 3.5–5.3)
PROT SERPL-MCNC: 8.5 GM/DL — HIGH (ref 6–8.3)
PROT UR-MCNC: NEGATIVE — SIGNIFICANT CHANGE UP
PROTHROM AB SERPL-ACNC: 11.5 SEC — SIGNIFICANT CHANGE UP (ref 10.5–13.4)
RBC # BLD: 5.17 M/UL — SIGNIFICANT CHANGE UP (ref 3.8–5.2)
RBC # FLD: 13.2 % — SIGNIFICANT CHANGE UP (ref 10.3–14.5)
RBC CASTS # UR COMP ASSIST: NEGATIVE /HPF — SIGNIFICANT CHANGE UP (ref 0–4)
SODIUM SERPL-SCNC: 135 MMOL/L — SIGNIFICANT CHANGE UP (ref 135–145)
SP GR SPEC: 1.02 — SIGNIFICANT CHANGE UP (ref 1.01–1.02)
TROPONIN I, HIGH SENSITIVITY RESULT: 4.69 NG/L — SIGNIFICANT CHANGE UP
UROBILINOGEN FLD QL: NEGATIVE — SIGNIFICANT CHANGE UP
WBC # BLD: 8.97 K/UL — SIGNIFICANT CHANGE UP (ref 3.8–10.5)
WBC # FLD AUTO: 8.97 K/UL — SIGNIFICANT CHANGE UP (ref 3.8–10.5)
WBC UR QL: ABNORMAL /HPF (ref 0–5)

## 2023-04-06 PROCEDURE — 96374 THER/PROPH/DIAG INJ IV PUSH: CPT | Mod: XU

## 2023-04-06 PROCEDURE — 86850 RBC ANTIBODY SCREEN: CPT

## 2023-04-06 PROCEDURE — 85610 PROTHROMBIN TIME: CPT

## 2023-04-06 PROCEDURE — 85025 COMPLETE CBC W/AUTO DIFF WBC: CPT

## 2023-04-06 PROCEDURE — 86900 BLOOD TYPING SEROLOGIC ABO: CPT

## 2023-04-06 PROCEDURE — 99285 EMERGENCY DEPT VISIT HI MDM: CPT | Mod: 25

## 2023-04-06 PROCEDURE — 93010 ELECTROCARDIOGRAM REPORT: CPT

## 2023-04-06 PROCEDURE — 87086 URINE CULTURE/COLONY COUNT: CPT

## 2023-04-06 PROCEDURE — 85730 THROMBOPLASTIN TIME PARTIAL: CPT

## 2023-04-06 PROCEDURE — 99284 EMERGENCY DEPT VISIT MOD MDM: CPT

## 2023-04-06 PROCEDURE — 74174 CTA ABD&PLVS W/CONTRAST: CPT | Mod: 26,MA

## 2023-04-06 PROCEDURE — 93005 ELECTROCARDIOGRAM TRACING: CPT

## 2023-04-06 PROCEDURE — 82010 KETONE BODYS QUAN: CPT

## 2023-04-06 PROCEDURE — 80053 COMPREHEN METABOLIC PANEL: CPT

## 2023-04-06 PROCEDURE — 36415 COLL VENOUS BLD VENIPUNCTURE: CPT

## 2023-04-06 PROCEDURE — 83735 ASSAY OF MAGNESIUM: CPT

## 2023-04-06 PROCEDURE — 87186 SC STD MICRODIL/AGAR DIL: CPT

## 2023-04-06 PROCEDURE — 81001 URINALYSIS AUTO W/SCOPE: CPT

## 2023-04-06 PROCEDURE — 74174 CTA ABD&PLVS W/CONTRAST: CPT | Mod: MA

## 2023-04-06 PROCEDURE — 86901 BLOOD TYPING SEROLOGIC RH(D): CPT

## 2023-04-06 PROCEDURE — 84100 ASSAY OF PHOSPHORUS: CPT

## 2023-04-06 PROCEDURE — 83605 ASSAY OF LACTIC ACID: CPT

## 2023-04-06 PROCEDURE — 84484 ASSAY OF TROPONIN QUANT: CPT

## 2023-04-06 PROCEDURE — 83690 ASSAY OF LIPASE: CPT

## 2023-04-06 RX ORDER — SODIUM CHLORIDE 9 MG/ML
500 INJECTION INTRAMUSCULAR; INTRAVENOUS; SUBCUTANEOUS ONCE
Refills: 0 | Status: COMPLETED | OUTPATIENT
Start: 2023-04-06 | End: 2023-04-06

## 2023-04-06 RX ORDER — FAMOTIDINE 10 MG/ML
20 INJECTION INTRAVENOUS ONCE
Refills: 0 | Status: DISCONTINUED | OUTPATIENT
Start: 2023-04-06 | End: 2023-04-06

## 2023-04-06 RX ORDER — FAMOTIDINE 10 MG/ML
20 INJECTION INTRAVENOUS ONCE
Refills: 0 | Status: COMPLETED | OUTPATIENT
Start: 2023-04-06 | End: 2023-04-06

## 2023-04-06 RX ORDER — ONDANSETRON 8 MG/1
4 TABLET, FILM COATED ORAL ONCE
Refills: 0 | Status: COMPLETED | OUTPATIENT
Start: 2023-04-06 | End: 2023-04-06

## 2023-04-06 RX ORDER — MORPHINE SULFATE 50 MG/1
2 CAPSULE, EXTENDED RELEASE ORAL ONCE
Refills: 0 | Status: DISCONTINUED | OUTPATIENT
Start: 2023-04-06 | End: 2023-04-06

## 2023-04-06 RX ADMIN — FAMOTIDINE 20 MILLIGRAM(S): 10 INJECTION INTRAVENOUS at 07:55

## 2023-04-06 RX ADMIN — SODIUM CHLORIDE 500 MILLILITER(S): 9 INJECTION INTRAMUSCULAR; INTRAVENOUS; SUBCUTANEOUS at 07:57

## 2023-04-06 NOTE — ED PROVIDER NOTE - NSICDXPASTMEDICALHX_GEN_ALL_CORE_FT
PAST MEDICAL HISTORY:  Diverticulitis     HLD (hyperlipidemia)     Hypothyroid     Thyroid cyst being followed by Endocrinologist Dr. Nate Mary

## 2023-04-06 NOTE — ED PROVIDER NOTE - NSFOLLOWUPINSTRUCTIONS_ED_ALL_ED_FT
You were seen in the Emergency Department for abdominal pain.    Your labs, CT and urinalysis were normal.    Discharge instructions:    - Please follow up with your Primary Care Doctor in 2-3 days.    - Tylenol up to 650 mg every 8 hours as needed for pain and/or Motrin up to 600 mg every 6 hours as needed for pain.     - Take any prescribed medications as instructed:     - Be sure to return to the ED if you develop new or worsening symptoms.     - Specific signs and symptoms to be vigilant of: worsening or persistent abdominal pain, pain that radiates to the back or groin, pain that turns from vague to sharp, severe nausea or vomiting, inability to eat or drink, severe diarrhea or constipation, blood in the stool, black, tarry stools, excessive vomiting, blood or bile in the vomit, pain associated with lightheadedness, shortness of breath or chest pain.

## 2023-04-06 NOTE — ED PROVIDER NOTE - CLINICAL SUMMARY MEDICAL DECISION MAKING FREE TEXT BOX
History and physical as documented above.  My suspicion for ruptured AAA is overall low iso how well the patient appears, but I cannot rule it out given my exam.  Higher likelihood of obstruction given hx of surg, but had a BM earlier.   Also, lower suspicion for ischemic bowel iso how well patient appears.  Will check labs, CTA to eval for AAA vs ischemia, symptom management, disposition pending work-up and response to treatment.

## 2023-04-06 NOTE — ED PROVIDER NOTE - PROGRESS NOTE DETAILS
Tong: labs reassuring, CT normal, UA negative. Low concern for intra-abdominal emergency. All lab and imaging results reviewed with the patient. The patient was provided an opportunity to ask questions and voice their concerns, all of which were addressed at length. Strict return precautions discussed with the patient. The patient verbalized understanding of the plan and agrees to follow through with it. The patient is safe for discharge at this time with close outpatient follow up.

## 2023-04-06 NOTE — ED PROVIDER NOTE - PHYSICAL EXAMINATION
GENERAL: non-toxic appearing, in NAD  HEAD: atraumatic, normocephalic  EYES: vision grossly intact, no conjunctivitis or discharge  EARS: hearing grossly intact  NOSE: no nasal discharge, epistaxis   CARDIAC: RRR, normal S1S2,  no appreciable murmurs, no cyanosis, cap refill < 2 seconds  PULM: no respiratory distress, oxygen saturation on RA wnl, CTAB, no crackles, rales, rhonchi, or wheezing  GI: abdomen nondistended, soft, mildly ttp in epigastrium, palpable pulsatile mass in mid-abdomen, no guarding or rebound tenderness  NEURO: awake and alert, follows commands, normal speech, PERRLA, EOMI, no focal motor or sensory deficits, normal gait  MSK: spine appears normal, no joint swelling or erythema, no gross deformities of extremities  EXT: no peripheral edema, calf tenderness, redness or swelling  SKIN: warm, dry, and intact, no rashes  PSYCH: appropriate mood and affect

## 2023-04-06 NOTE — ED PROVIDER NOTE - OBJECTIVE STATEMENT
73F hx of pre-DM, hypothyroidism, HLD, diverticulitis s/p LAR with splenic flexure takedown and rigid sigmoidoscopy for severe diverticulitis in 2019 presenting with dull epigastric/mid-abdominal pain x 2 days. Comes and goes. No association with eating. Nothing makes it better or worse. No radiation of pain. Last BM today. No blood in stool. Passing flatus. No urinary symptoms. No chest pain or dyspnea. Had 1 episode of vomiting. Denies nausea now.

## 2023-04-06 NOTE — ED PROVIDER NOTE - NS ED ROS FT
GENERAL: no fever, chills, fatigue, weight loss, night sweats  HEENT: no eye pain, discharge, conjunctivitis, ear pain, hearing loss, rhinorrhea, congestion, throat pain  CARDIAC: no chest pain, palpitations, lightheadedness, syncope  PULM: no dyspnea, wheezing  GI: + abdominal pain, nausea, vomiting  : no urinary dysuria, frequency, incontinence, hematuria  NEURO: no headache, changes in vision, motor weakness, sensory changes  MSK: no joint pain, joint swelling, myalgias  SKIN: no rashes  HEME: no active bleeding, excessive bruising

## 2023-04-06 NOTE — ED PROVIDER NOTE - PATIENT PORTAL LINK FT
You can access the FollowMyHealth Patient Portal offered by [facility] by registering at the following website: [email or web address]. By joining [employer]’s FollowMyHealth portal, you will also be able to view your health information using other applications (apps) compatible with our system.

## 2023-04-06 NOTE — ED PROVIDER NOTE - NSTIMEPROVIDERCAREINITIATE_GEN_ER
Vu at [facility] states the nasal Ointment of  Bactroban is not available. Vu states they have the regular Bactroban in stock, states the difference is nasal ointment comes in individual tubes and regular Bactroban comes in tube. Pt would need to use a qtip to apply tubed Bactroban. If this is appropriate  for pt please submit new  rx for regular Bactroban to pharmacy. Please advise/authorize?    
----- Message from Symone Kingsley sent at 4/20/2018  1:34 PM CDT -----  Contact: [facility]            Name of Who is Calling:  Radha Chilel [[id number]]    What is the request in detail: pts mupirocin calcium 2% nasl oint (BACTROBAN) 2 % Oint is not available at pharmacy.. Please advise      Can the clinic reply by [facility]: no      What Number to [facility] Back [facility]: [phone]                                    
06-Apr-2023 06:53

## 2023-08-02 ENCOUNTER — APPOINTMENT (OUTPATIENT)
Dept: ENDOCRINOLOGY | Facility: CLINIC | Age: 78
End: 2023-08-02

## 2023-11-06 ENCOUNTER — APPOINTMENT (OUTPATIENT)
Dept: ENDOCRINOLOGY | Facility: CLINIC | Age: 78
End: 2023-11-06

## 2024-05-08 NOTE — ED ADULT TRIAGE NOTE - MODE OF ARRIVAL
59 y/o female with PMHx of remote Ovarian Cancer s/p HEMALATHA, HLD, Pre-Diabetes, Osteoporosis, Vitamin D Deficiency, and Cholelithiasis presents to the ED c/o epigastric abdominal pain and vomiting. Patient reports that pain started last night after eating dinner around 8pm.  She reports that pain has come in intermittent waves since initial onset and has been associated with multiple episodes of vomiting. Patient has never had similar in the past. She denies chest pain, sob, diarrhea, fevers     Rapid assessment by Twila Garcia PA-C full eval to be performed in ED [facility] in 57 y/o female with PMHx of remote Ovarian Cancer s/p HEMALATHA, HLD, Pre-Diabetes, Osteoporosis, Vitamin D Deficiency, and Cholelithiasis presents to the ED c/o epigastric abdominal pain and vomiting. Patient reports that pain started last night after eating dinner around 8pm.  She reports that pain has come in intermittent waves since initial onset and has been associated with multiple episodes of vomiting. Patient has never had similar in the past. She denies chest pain, sob, diarrhea, fevers     Rapid assessment by Twila Garcia PA-C full eval to be performed in Emergency Department    Attending MD Nunez: This patient was seen and orders were placed by the PA as per our department's QPA model.  I was not consulted in regards to this patient although I was present and available in the Emergency Department to the PA.  Patient was to be sent to main ED for full medical evaluation and receiving team was to follow up on any labs, analgesia, clinical imaging ordered by the PA.  Any reassessment and disposition decisions were to be made by receiving team as clinically indicated, all decisions regarding the progression of care to be made at their discretion.  I did not perform a comprehensive history and physical on this patient.

## 2024-12-23 NOTE — PATIENT PROFILE ADULT - DO YOU FEEL LIKE HURTING OTHERS?
Medication: ASA 81 mg daily passed protocol.   Last office visit date: 5/9/2024  Next appointment scheduled?: Yes   Number of refills given: 3    no